# Patient Record
Sex: MALE | Race: BLACK OR AFRICAN AMERICAN | NOT HISPANIC OR LATINO | Employment: OTHER | RURAL
[De-identification: names, ages, dates, MRNs, and addresses within clinical notes are randomized per-mention and may not be internally consistent; named-entity substitution may affect disease eponyms.]

---

## 2020-03-30 LAB — PSA SERPL-MCNC: 2.61 NG/ML

## 2021-01-13 LAB
CHOLEST SERPL-MSCNC: 217 MG/DL (ref 0–200)
HDLC SERPL-MCNC: 53 MG/DL (ref 35–70)
LDLC SERPL CALC-MCNC: 141 MG/DL (ref 0–160)
TRIGL SERPL-MCNC: 116 MG/DL (ref 40–160)

## 2021-03-25 ENCOUNTER — OFFICE VISIT (OUTPATIENT)
Dept: CARDIOLOGY | Facility: CLINIC | Age: 76
End: 2021-03-25
Payer: COMMERCIAL

## 2021-03-25 VITALS
HEART RATE: 72 BPM | OXYGEN SATURATION: 99 % | HEIGHT: 72 IN | WEIGHT: 164 LBS | SYSTOLIC BLOOD PRESSURE: 134 MMHG | DIASTOLIC BLOOD PRESSURE: 68 MMHG | BODY MASS INDEX: 22.21 KG/M2

## 2021-03-25 VITALS
DIASTOLIC BLOOD PRESSURE: 58 MMHG | BODY MASS INDEX: 21.72 KG/M2 | WEIGHT: 160.38 LBS | HEART RATE: 66 BPM | SYSTOLIC BLOOD PRESSURE: 154 MMHG | RESPIRATION RATE: 18 BRPM | HEIGHT: 72 IN

## 2021-03-25 DIAGNOSIS — E78.00 PURE HYPERCHOLESTEROLEMIA: ICD-10-CM

## 2021-03-25 DIAGNOSIS — I25.10 CORONARY ARTERY DISEASE, ANGINA PRESENCE UNSPECIFIED, UNSPECIFIED VESSEL OR LESION TYPE, UNSPECIFIED WHETHER NATIVE OR TRANSPLANTED HEART: Primary | ICD-10-CM

## 2021-03-25 DIAGNOSIS — I21.9 MYOCARDIAL INFARCTION, UNSPECIFIED MI TYPE, UNSPECIFIED ARTERY: ICD-10-CM

## 2021-03-25 DIAGNOSIS — I10 ESSENTIAL HYPERTENSION: ICD-10-CM

## 2021-03-25 DIAGNOSIS — I25.10 CORONARY ARTERY DISEASE INVOLVING NATIVE CORONARY ARTERY OF NATIVE HEART WITHOUT ANGINA PECTORIS: ICD-10-CM

## 2021-03-25 DIAGNOSIS — I73.9 PVD (PERIPHERAL VASCULAR DISEASE): ICD-10-CM

## 2021-03-25 PROCEDURE — 99212 OFFICE O/P EST SF 10 MIN: CPT | Mod: S$PBB,,, | Performed by: NURSE PRACTITIONER

## 2021-03-25 PROCEDURE — 3075F PR MOST RECENT SYSTOLIC BLOOD PRESS GE 130-139MM HG: ICD-10-PCS | Mod: CPTII,,, | Performed by: NURSE PRACTITIONER

## 2021-03-25 PROCEDURE — 93010 ELECTROCARDIOGRAM REPORT: CPT | Mod: S$PBB,,, | Performed by: INTERNAL MEDICINE

## 2021-03-25 PROCEDURE — 99212 PR OFFICE/OUTPT VISIT, EST, LEVL II, 10-19 MIN: ICD-10-PCS | Mod: S$PBB,,, | Performed by: NURSE PRACTITIONER

## 2021-03-25 PROCEDURE — 3078F DIAST BP <80 MM HG: CPT | Mod: CPTII,,, | Performed by: NURSE PRACTITIONER

## 2021-03-25 PROCEDURE — 99999 PR PBB SHADOW E&M-EST. PATIENT-LVL IV: ICD-10-PCS | Mod: PBBFAC,,, | Performed by: NURSE PRACTITIONER

## 2021-03-25 PROCEDURE — 99214 OFFICE O/P EST MOD 30 MIN: CPT | Mod: PBBFAC | Performed by: NURSE PRACTITIONER

## 2021-03-25 PROCEDURE — 3075F SYST BP GE 130 - 139MM HG: CPT | Mod: CPTII,,, | Performed by: NURSE PRACTITIONER

## 2021-03-25 PROCEDURE — 1159F MED LIST DOCD IN RCRD: CPT | Mod: ,,, | Performed by: NURSE PRACTITIONER

## 2021-03-25 PROCEDURE — 99999 PR PBB SHADOW E&M-EST. PATIENT-LVL IV: CPT | Mod: PBBFAC,,, | Performed by: NURSE PRACTITIONER

## 2021-03-25 PROCEDURE — 93005 ELECTROCARDIOGRAM TRACING: CPT | Mod: PBBFAC | Performed by: INTERNAL MEDICINE

## 2021-03-25 PROCEDURE — 3078F PR MOST RECENT DIASTOLIC BLOOD PRESSURE < 80 MM HG: ICD-10-PCS | Mod: CPTII,,, | Performed by: NURSE PRACTITIONER

## 2021-03-25 PROCEDURE — 93010 EKG 12-LEAD: ICD-10-PCS | Mod: S$PBB,,, | Performed by: INTERNAL MEDICINE

## 2021-03-25 PROCEDURE — 1159F PR MEDICATION LIST DOCUMENTED IN MEDICAL RECORD: ICD-10-PCS | Mod: ,,, | Performed by: NURSE PRACTITIONER

## 2021-03-25 RX ORDER — PROPRANOLOL HYDROCHLORIDE 40 MG/1
40 TABLET ORAL 2 TIMES DAILY
COMMUNITY
Start: 2021-01-13 | End: 2021-07-06 | Stop reason: SDUPTHER

## 2021-03-25 RX ORDER — CLOPIDOGREL BISULFATE 75 MG/1
75 TABLET ORAL DAILY
COMMUNITY
Start: 2021-01-13 | End: 2021-07-06 | Stop reason: SDUPTHER

## 2021-03-25 RX ORDER — HYDROCHLOROTHIAZIDE 25 MG/1
25 TABLET ORAL DAILY
COMMUNITY
Start: 2021-01-13 | End: 2021-07-06 | Stop reason: SDUPTHER

## 2021-03-25 RX ORDER — LISINOPRIL 20 MG/1
20 TABLET ORAL DAILY
COMMUNITY
Start: 2021-01-13 | End: 2021-07-06 | Stop reason: SDUPTHER

## 2021-03-25 RX ORDER — VERAPAMIL HYDROCHLORIDE 240 MG/1
240 TABLET, FILM COATED, EXTENDED RELEASE ORAL DAILY
COMMUNITY
Start: 2021-01-13 | End: 2021-07-06 | Stop reason: SDUPTHER

## 2021-03-25 RX ORDER — CILOSTAZOL 50 MG/1
50 TABLET ORAL 2 TIMES DAILY
COMMUNITY
Start: 2021-01-18 | End: 2021-07-06 | Stop reason: SDUPTHER

## 2021-03-25 RX ORDER — ROSUVASTATIN CALCIUM 5 MG/1
5 TABLET, COATED ORAL DAILY
COMMUNITY
Start: 2021-01-13 | End: 2021-07-06 | Stop reason: SDUPTHER

## 2021-07-06 ENCOUNTER — OFFICE VISIT (OUTPATIENT)
Dept: FAMILY MEDICINE | Facility: CLINIC | Age: 76
End: 2021-07-06
Payer: COMMERCIAL

## 2021-07-06 VITALS
HEART RATE: 58 BPM | BODY MASS INDEX: 21.81 KG/M2 | WEIGHT: 161 LBS | TEMPERATURE: 97 F | DIASTOLIC BLOOD PRESSURE: 71 MMHG | SYSTOLIC BLOOD PRESSURE: 143 MMHG | HEIGHT: 72 IN

## 2021-07-06 DIAGNOSIS — I25.10 CORONARY ARTERY DISEASE INVOLVING NATIVE CORONARY ARTERY OF NATIVE HEART WITHOUT ANGINA PECTORIS: ICD-10-CM

## 2021-07-06 DIAGNOSIS — E78.5 HYPERLIPIDEMIA, UNSPECIFIED HYPERLIPIDEMIA TYPE: ICD-10-CM

## 2021-07-06 DIAGNOSIS — I10 ESSENTIAL HYPERTENSION: Primary | ICD-10-CM

## 2021-07-06 DIAGNOSIS — I73.9 PVD (PERIPHERAL VASCULAR DISEASE): ICD-10-CM

## 2021-07-06 LAB
ANION GAP SERPL CALCULATED.3IONS-SCNC: 12 MMOL/L (ref 7–16)
BASOPHILS # BLD AUTO: 0.03 K/UL (ref 0–0.2)
BASOPHILS NFR BLD AUTO: 0.7 % (ref 0–1)
BUN SERPL-MCNC: 26 MG/DL (ref 7–18)
BUN/CREAT SERPL: 11 (ref 6–20)
CALCIUM SERPL-MCNC: 9.6 MG/DL (ref 8.5–10.1)
CHLORIDE SERPL-SCNC: 108 MMOL/L (ref 98–107)
CO2 SERPL-SCNC: 22 MMOL/L (ref 21–32)
CREAT SERPL-MCNC: 2.33 MG/DL (ref 0.7–1.3)
DIFFERENTIAL METHOD BLD: ABNORMAL
EOSINOPHIL # BLD AUTO: 0.28 K/UL (ref 0–0.5)
EOSINOPHIL NFR BLD AUTO: 6.3 % (ref 1–4)
ERYTHROCYTE [DISTWIDTH] IN BLOOD BY AUTOMATED COUNT: 15.5 % (ref 11.5–14.5)
GLUCOSE SERPL-MCNC: 121 MG/DL (ref 74–106)
HCT VFR BLD AUTO: 37.2 % (ref 40–54)
HGB BLD-MCNC: 11.9 G/DL (ref 13.5–18)
IMM GRANULOCYTES # BLD AUTO: 0.01 K/UL (ref 0–0.04)
IMM GRANULOCYTES NFR BLD: 0.2 % (ref 0–0.4)
LYMPHOCYTES # BLD AUTO: 0.84 K/UL (ref 1–4.8)
LYMPHOCYTES NFR BLD AUTO: 18.8 % (ref 27–41)
MCH RBC QN AUTO: 24.5 PG (ref 27–31)
MCHC RBC AUTO-ENTMCNC: 32 G/DL (ref 32–36)
MCV RBC AUTO: 76.7 FL (ref 80–96)
MONOCYTES # BLD AUTO: 0.71 K/UL (ref 0–0.8)
MONOCYTES NFR BLD AUTO: 15.9 % (ref 2–6)
MPC BLD CALC-MCNC: 11.4 FL (ref 9.4–12.4)
NEUTROPHILS # BLD AUTO: 2.59 K/UL (ref 1.8–7.7)
NEUTROPHILS NFR BLD AUTO: 58.1 % (ref 53–65)
NRBC # BLD AUTO: 0 X10E3/UL
NRBC, AUTO (.00): 0 %
PLATELET # BLD AUTO: 226 K/UL (ref 150–400)
POTASSIUM SERPL-SCNC: 4.1 MMOL/L (ref 3.5–5.1)
RBC # BLD AUTO: 4.85 M/UL (ref 4.6–6.2)
SODIUM SERPL-SCNC: 138 MMOL/L (ref 136–145)
TSH SERPL DL<=0.005 MIU/L-ACNC: 1.18 UIU/ML (ref 0.36–3.74)
WBC # BLD AUTO: 4.46 K/UL (ref 4.5–11)

## 2021-07-06 PROCEDURE — 84443 TSH: ICD-10-PCS | Mod: ,,, | Performed by: CLINICAL MEDICAL LABORATORY

## 2021-07-06 PROCEDURE — 1126F AMNT PAIN NOTED NONE PRSNT: CPT | Mod: ,,, | Performed by: NURSE PRACTITIONER

## 2021-07-06 PROCEDURE — 80048 BASIC METABOLIC PANEL: ICD-10-PCS | Mod: ,,, | Performed by: CLINICAL MEDICAL LABORATORY

## 2021-07-06 PROCEDURE — 1159F PR MEDICATION LIST DOCUMENTED IN MEDICAL RECORD: ICD-10-PCS | Mod: ,,, | Performed by: NURSE PRACTITIONER

## 2021-07-06 PROCEDURE — 85025 COMPLETE CBC W/AUTO DIFF WBC: CPT | Mod: ,,, | Performed by: CLINICAL MEDICAL LABORATORY

## 2021-07-06 PROCEDURE — 3077F SYST BP >= 140 MM HG: CPT | Mod: CPTII,,, | Performed by: NURSE PRACTITIONER

## 2021-07-06 PROCEDURE — 99213 OFFICE O/P EST LOW 20 MIN: CPT | Mod: ,,, | Performed by: NURSE PRACTITIONER

## 2021-07-06 PROCEDURE — 80048 BASIC METABOLIC PNL TOTAL CA: CPT | Mod: ,,, | Performed by: CLINICAL MEDICAL LABORATORY

## 2021-07-06 PROCEDURE — 3077F PR MOST RECENT SYSTOLIC BLOOD PRESSURE >= 140 MM HG: ICD-10-PCS | Mod: CPTII,,, | Performed by: NURSE PRACTITIONER

## 2021-07-06 PROCEDURE — 3078F PR MOST RECENT DIASTOLIC BLOOD PRESSURE < 80 MM HG: ICD-10-PCS | Mod: CPTII,,, | Performed by: NURSE PRACTITIONER

## 2021-07-06 PROCEDURE — 84443 ASSAY THYROID STIM HORMONE: CPT | Mod: ,,, | Performed by: CLINICAL MEDICAL LABORATORY

## 2021-07-06 PROCEDURE — 1126F PR PAIN SEVERITY QUANTIFIED, NO PAIN PRESENT: ICD-10-PCS | Mod: ,,, | Performed by: NURSE PRACTITIONER

## 2021-07-06 PROCEDURE — 3078F DIAST BP <80 MM HG: CPT | Mod: CPTII,,, | Performed by: NURSE PRACTITIONER

## 2021-07-06 PROCEDURE — 99213 PR OFFICE/OUTPT VISIT, EST, LEVL III, 20-29 MIN: ICD-10-PCS | Mod: ,,, | Performed by: NURSE PRACTITIONER

## 2021-07-06 PROCEDURE — 85025 CBC WITH DIFFERENTIAL: ICD-10-PCS | Mod: ,,, | Performed by: CLINICAL MEDICAL LABORATORY

## 2021-07-06 PROCEDURE — 1159F MED LIST DOCD IN RCRD: CPT | Mod: ,,, | Performed by: NURSE PRACTITIONER

## 2021-07-06 RX ORDER — HYDROCHLOROTHIAZIDE 25 MG/1
25 TABLET ORAL DAILY
Qty: 90 TABLET | Refills: 0 | Status: SHIPPED | OUTPATIENT
Start: 2021-07-06 | End: 2021-10-11 | Stop reason: SDUPTHER

## 2021-07-06 RX ORDER — CLOPIDOGREL BISULFATE 75 MG/1
75 TABLET ORAL DAILY
Qty: 90 TABLET | Refills: 0 | Status: SHIPPED | OUTPATIENT
Start: 2021-07-06 | End: 2021-10-11 | Stop reason: SDUPTHER

## 2021-07-06 RX ORDER — PROPRANOLOL HYDROCHLORIDE 40 MG/1
40 TABLET ORAL 2 TIMES DAILY
Qty: 180 TABLET | Refills: 0 | Status: SHIPPED | OUTPATIENT
Start: 2021-07-06 | End: 2021-10-11 | Stop reason: SDUPTHER

## 2021-07-06 RX ORDER — VERAPAMIL HYDROCHLORIDE 240 MG/1
240 TABLET, FILM COATED, EXTENDED RELEASE ORAL DAILY
Qty: 90 TABLET | Refills: 0 | Status: SHIPPED | OUTPATIENT
Start: 2021-07-06 | End: 2021-10-11 | Stop reason: SDUPTHER

## 2021-07-06 RX ORDER — ROSUVASTATIN CALCIUM 5 MG/1
5 TABLET, COATED ORAL DAILY
Qty: 90 TABLET | Refills: 0 | Status: SHIPPED | OUTPATIENT
Start: 2021-07-06 | End: 2021-10-11 | Stop reason: SDUPTHER

## 2021-07-06 RX ORDER — LISINOPRIL 20 MG/1
20 TABLET ORAL DAILY
Qty: 90 TABLET | Refills: 0 | Status: SHIPPED | OUTPATIENT
Start: 2021-07-06 | End: 2021-10-11 | Stop reason: SDUPTHER

## 2021-07-06 RX ORDER — CILOSTAZOL 50 MG/1
50 TABLET ORAL 2 TIMES DAILY
Qty: 180 TABLET | Refills: 0 | Status: SHIPPED | OUTPATIENT
Start: 2021-07-06 | End: 2021-10-11 | Stop reason: SDUPTHER

## 2021-07-21 ENCOUNTER — OFFICE VISIT (OUTPATIENT)
Dept: FAMILY MEDICINE | Facility: CLINIC | Age: 76
End: 2021-07-21
Payer: COMMERCIAL

## 2021-07-21 VITALS
OXYGEN SATURATION: 99 % | HEIGHT: 72 IN | WEIGHT: 157 LBS | BODY MASS INDEX: 21.26 KG/M2 | HEART RATE: 76 BPM | SYSTOLIC BLOOD PRESSURE: 181 MMHG | DIASTOLIC BLOOD PRESSURE: 84 MMHG | RESPIRATION RATE: 16 BRPM

## 2021-07-21 DIAGNOSIS — E78.5 HYPERLIPIDEMIA, UNSPECIFIED HYPERLIPIDEMIA TYPE: ICD-10-CM

## 2021-07-21 DIAGNOSIS — I10 ESSENTIAL HYPERTENSION: Primary | ICD-10-CM

## 2021-07-21 DIAGNOSIS — I73.9 PVD (PERIPHERAL VASCULAR DISEASE): ICD-10-CM

## 2021-07-21 DIAGNOSIS — I25.10 CORONARY ARTERY DISEASE INVOLVING NATIVE CORONARY ARTERY OF NATIVE HEART WITHOUT ANGINA PECTORIS: ICD-10-CM

## 2021-07-21 PROCEDURE — 1101F PT FALLS ASSESS-DOCD LE1/YR: CPT | Mod: CPTII,,, | Performed by: NURSE PRACTITIONER

## 2021-07-21 PROCEDURE — 3077F PR MOST RECENT SYSTOLIC BLOOD PRESSURE >= 140 MM HG: ICD-10-PCS | Mod: CPTII,,, | Performed by: NURSE PRACTITIONER

## 2021-07-21 PROCEDURE — G0439 PR MEDICARE ANNUAL WELLNESS SUBSEQUENT VISIT: ICD-10-PCS | Mod: ,,, | Performed by: NURSE PRACTITIONER

## 2021-07-21 PROCEDURE — 1101F PR PT FALLS ASSESS DOC 0-1 FALLS W/OUT INJ PAST YR: ICD-10-PCS | Mod: CPTII,,, | Performed by: NURSE PRACTITIONER

## 2021-07-21 PROCEDURE — 3288F FALL RISK ASSESSMENT DOCD: CPT | Mod: CPTII,,, | Performed by: NURSE PRACTITIONER

## 2021-07-21 PROCEDURE — 3077F SYST BP >= 140 MM HG: CPT | Mod: CPTII,,, | Performed by: NURSE PRACTITIONER

## 2021-07-21 PROCEDURE — 3079F DIAST BP 80-89 MM HG: CPT | Mod: CPTII,,, | Performed by: NURSE PRACTITIONER

## 2021-07-21 PROCEDURE — 1126F AMNT PAIN NOTED NONE PRSNT: CPT | Mod: CPTII,,, | Performed by: NURSE PRACTITIONER

## 2021-07-21 PROCEDURE — 3079F PR MOST RECENT DIASTOLIC BLOOD PRESSURE 80-89 MM HG: ICD-10-PCS | Mod: CPTII,,, | Performed by: NURSE PRACTITIONER

## 2021-07-21 PROCEDURE — 3288F PR FALLS RISK ASSESSMENT DOCUMENTED: ICD-10-PCS | Mod: CPTII,,, | Performed by: NURSE PRACTITIONER

## 2021-07-21 PROCEDURE — G0439 PPPS, SUBSEQ VISIT: HCPCS | Mod: ,,, | Performed by: NURSE PRACTITIONER

## 2021-07-21 PROCEDURE — 1126F PR PAIN SEVERITY QUANTIFIED, NO PAIN PRESENT: ICD-10-PCS | Mod: CPTII,,, | Performed by: NURSE PRACTITIONER

## 2021-10-11 ENCOUNTER — OFFICE VISIT (OUTPATIENT)
Dept: FAMILY MEDICINE | Facility: CLINIC | Age: 76
End: 2021-10-11
Payer: COMMERCIAL

## 2021-10-11 VITALS
HEIGHT: 72 IN | TEMPERATURE: 97 F | DIASTOLIC BLOOD PRESSURE: 82 MMHG | SYSTOLIC BLOOD PRESSURE: 144 MMHG | BODY MASS INDEX: 21.26 KG/M2 | HEART RATE: 58 BPM | OXYGEN SATURATION: 100 % | WEIGHT: 157 LBS

## 2021-10-11 DIAGNOSIS — I73.9 PVD (PERIPHERAL VASCULAR DISEASE): ICD-10-CM

## 2021-10-11 DIAGNOSIS — I21.9 MYOCARDIAL INFARCTION, UNSPECIFIED MI TYPE, UNSPECIFIED ARTERY: ICD-10-CM

## 2021-10-11 DIAGNOSIS — I25.10 CORONARY ARTERY DISEASE INVOLVING NATIVE CORONARY ARTERY OF NATIVE HEART WITHOUT ANGINA PECTORIS: ICD-10-CM

## 2021-10-11 DIAGNOSIS — E78.5 HYPERLIPIDEMIA, UNSPECIFIED HYPERLIPIDEMIA TYPE: ICD-10-CM

## 2021-10-11 DIAGNOSIS — I10 ESSENTIAL HYPERTENSION: Primary | ICD-10-CM

## 2021-10-11 PROCEDURE — 99213 OFFICE O/P EST LOW 20 MIN: CPT | Mod: ,,, | Performed by: NURSE PRACTITIONER

## 2021-10-11 PROCEDURE — 1101F PT FALLS ASSESS-DOCD LE1/YR: CPT | Mod: CPTII,,, | Performed by: NURSE PRACTITIONER

## 2021-10-11 PROCEDURE — 1160F RVW MEDS BY RX/DR IN RCRD: CPT | Mod: CPTII,,, | Performed by: NURSE PRACTITIONER

## 2021-10-11 PROCEDURE — 3288F FALL RISK ASSESSMENT DOCD: CPT | Mod: CPTII,,, | Performed by: NURSE PRACTITIONER

## 2021-10-11 PROCEDURE — 1160F PR REVIEW ALL MEDS BY PRESCRIBER/CLIN PHARMACIST DOCUMENTED: ICD-10-PCS | Mod: CPTII,,, | Performed by: NURSE PRACTITIONER

## 2021-10-11 PROCEDURE — 1101F PR PT FALLS ASSESS DOC 0-1 FALLS W/OUT INJ PAST YR: ICD-10-PCS | Mod: CPTII,,, | Performed by: NURSE PRACTITIONER

## 2021-10-11 PROCEDURE — 1159F MED LIST DOCD IN RCRD: CPT | Mod: CPTII,,, | Performed by: NURSE PRACTITIONER

## 2021-10-11 PROCEDURE — 3077F SYST BP >= 140 MM HG: CPT | Mod: CPTII,,, | Performed by: NURSE PRACTITIONER

## 2021-10-11 PROCEDURE — 3288F PR FALLS RISK ASSESSMENT DOCUMENTED: ICD-10-PCS | Mod: CPTII,,, | Performed by: NURSE PRACTITIONER

## 2021-10-11 PROCEDURE — 3077F PR MOST RECENT SYSTOLIC BLOOD PRESSURE >= 140 MM HG: ICD-10-PCS | Mod: CPTII,,, | Performed by: NURSE PRACTITIONER

## 2021-10-11 PROCEDURE — 3079F DIAST BP 80-89 MM HG: CPT | Mod: CPTII,,, | Performed by: NURSE PRACTITIONER

## 2021-10-11 PROCEDURE — 99213 PR OFFICE/OUTPT VISIT, EST, LEVL III, 20-29 MIN: ICD-10-PCS | Mod: ,,, | Performed by: NURSE PRACTITIONER

## 2021-10-11 PROCEDURE — 1159F PR MEDICATION LIST DOCUMENTED IN MEDICAL RECORD: ICD-10-PCS | Mod: CPTII,,, | Performed by: NURSE PRACTITIONER

## 2021-10-11 PROCEDURE — 3079F PR MOST RECENT DIASTOLIC BLOOD PRESSURE 80-89 MM HG: ICD-10-PCS | Mod: CPTII,,, | Performed by: NURSE PRACTITIONER

## 2021-10-11 RX ORDER — CLOPIDOGREL BISULFATE 75 MG/1
75 TABLET ORAL DAILY
Qty: 90 TABLET | Refills: 0 | Status: SHIPPED | OUTPATIENT
Start: 2021-10-11 | End: 2022-01-01 | Stop reason: SDUPTHER

## 2021-10-11 RX ORDER — LISINOPRIL 20 MG/1
20 TABLET ORAL DAILY
Qty: 90 TABLET | Refills: 0 | Status: SHIPPED | OUTPATIENT
Start: 2021-10-11 | End: 2022-01-01 | Stop reason: SDUPTHER

## 2021-10-11 RX ORDER — VERAPAMIL HYDROCHLORIDE 240 MG/1
240 TABLET, FILM COATED, EXTENDED RELEASE ORAL DAILY
Qty: 90 TABLET | Refills: 0 | Status: SHIPPED | OUTPATIENT
Start: 2021-10-11 | End: 2022-01-01 | Stop reason: SDUPTHER

## 2021-10-11 RX ORDER — ROSUVASTATIN CALCIUM 5 MG/1
5 TABLET, COATED ORAL DAILY
Qty: 90 TABLET | Refills: 0 | Status: SHIPPED | OUTPATIENT
Start: 2021-10-11 | End: 2022-01-01 | Stop reason: SDUPTHER

## 2021-10-11 RX ORDER — HYDROCHLOROTHIAZIDE 25 MG/1
25 TABLET ORAL DAILY
Qty: 90 TABLET | Refills: 0 | Status: SHIPPED | OUTPATIENT
Start: 2021-10-11 | End: 2022-01-01 | Stop reason: SDUPTHER

## 2021-10-11 RX ORDER — CILOSTAZOL 50 MG/1
50 TABLET ORAL 2 TIMES DAILY
Qty: 180 TABLET | Refills: 0 | Status: SHIPPED | OUTPATIENT
Start: 2021-10-11 | End: 2022-01-01 | Stop reason: SDUPTHER

## 2021-10-11 RX ORDER — PROPRANOLOL HYDROCHLORIDE 40 MG/1
40 TABLET ORAL 2 TIMES DAILY
Qty: 180 TABLET | Refills: 0 | Status: SHIPPED | OUTPATIENT
Start: 2021-10-11 | End: 2022-01-01 | Stop reason: SDUPTHER

## 2021-12-09 ENCOUNTER — IMMUNIZATION (OUTPATIENT)
Dept: FAMILY MEDICINE | Facility: CLINIC | Age: 76
End: 2021-12-09
Payer: MEDICARE

## 2021-12-09 DIAGNOSIS — Z23 NEED FOR VACCINATION: Primary | ICD-10-CM

## 2021-12-09 PROCEDURE — 0064A COVID-19, MRNA, LNP-S, PF, 100 MCG/0.25 ML DOSE VACCINE (MODERNA BOOSTER): CPT | Mod: ,,, | Performed by: FAMILY MEDICINE

## 2021-12-09 PROCEDURE — 91306 COVID-19, MRNA, LNP-S, PF, 100 MCG/0.25 ML DOSE VACCINE (MODERNA BOOSTER): CPT | Mod: ,,, | Performed by: FAMILY MEDICINE

## 2021-12-09 PROCEDURE — 0064A COVID-19, MRNA, LNP-S, PF, 100 MCG/0.25 ML DOSE VACCINE (MODERNA BOOSTER): ICD-10-PCS | Mod: ,,, | Performed by: FAMILY MEDICINE

## 2021-12-09 PROCEDURE — 91306 COVID-19, MRNA, LNP-S, PF, 100 MCG/0.25 ML DOSE VACCINE (MODERNA BOOSTER): ICD-10-PCS | Mod: ,,, | Performed by: FAMILY MEDICINE

## 2022-01-01 ENCOUNTER — OFFICE VISIT (OUTPATIENT)
Dept: CARDIOLOGY | Facility: CLINIC | Age: 77
End: 2022-01-01
Payer: MEDICARE

## 2022-01-01 ENCOUNTER — OFFICE VISIT (OUTPATIENT)
Dept: FAMILY MEDICINE | Facility: CLINIC | Age: 77
End: 2022-01-01
Payer: MEDICARE

## 2022-01-01 ENCOUNTER — HOSPITAL ENCOUNTER (INPATIENT)
Facility: HOSPITAL | Age: 77
LOS: 2 days | DRG: 811 | End: 2023-01-02
Attending: STUDENT IN AN ORGANIZED HEALTH CARE EDUCATION/TRAINING PROGRAM | Admitting: STUDENT IN AN ORGANIZED HEALTH CARE EDUCATION/TRAINING PROGRAM
Payer: MEDICARE

## 2022-01-01 VITALS
TEMPERATURE: 97 F | WEIGHT: 161 LBS | HEIGHT: 72 IN | HEART RATE: 58 BPM | DIASTOLIC BLOOD PRESSURE: 65 MMHG | SYSTOLIC BLOOD PRESSURE: 125 MMHG | OXYGEN SATURATION: 100 % | BODY MASS INDEX: 21.81 KG/M2

## 2022-01-01 VITALS
HEIGHT: 72 IN | WEIGHT: 155 LBS | BODY MASS INDEX: 20.99 KG/M2 | DIASTOLIC BLOOD PRESSURE: 59 MMHG | OXYGEN SATURATION: 98 % | HEART RATE: 65 BPM | SYSTOLIC BLOOD PRESSURE: 138 MMHG | TEMPERATURE: 97 F

## 2022-01-01 VITALS
HEART RATE: 71 BPM | BODY MASS INDEX: 20.05 KG/M2 | HEIGHT: 72 IN | DIASTOLIC BLOOD PRESSURE: 96 MMHG | SYSTOLIC BLOOD PRESSURE: 205 MMHG | WEIGHT: 148 LBS | OXYGEN SATURATION: 97 %

## 2022-01-01 VITALS
WEIGHT: 151 LBS | SYSTOLIC BLOOD PRESSURE: 138 MMHG | HEIGHT: 72 IN | HEART RATE: 52 BPM | TEMPERATURE: 98 F | RESPIRATION RATE: 12 BRPM | BODY MASS INDEX: 20.45 KG/M2 | DIASTOLIC BLOOD PRESSURE: 74 MMHG | OXYGEN SATURATION: 99 %

## 2022-01-01 VITALS
OXYGEN SATURATION: 99 % | HEART RATE: 57 BPM | DIASTOLIC BLOOD PRESSURE: 60 MMHG | SYSTOLIC BLOOD PRESSURE: 136 MMHG | BODY MASS INDEX: 21.54 KG/M2 | WEIGHT: 159 LBS | HEIGHT: 72 IN

## 2022-01-01 DIAGNOSIS — D64.9 ANEMIA, UNSPECIFIED TYPE: ICD-10-CM

## 2022-01-01 DIAGNOSIS — D62 ACUTE BLOOD LOSS ANEMIA: ICD-10-CM

## 2022-01-01 DIAGNOSIS — I10 PRIMARY HYPERTENSION: Primary | ICD-10-CM

## 2022-01-01 DIAGNOSIS — I73.9 PVD (PERIPHERAL VASCULAR DISEASE): ICD-10-CM

## 2022-01-01 DIAGNOSIS — I10 ESSENTIAL HYPERTENSION: Chronic | ICD-10-CM

## 2022-01-01 DIAGNOSIS — I25.10 CORONARY ARTERY DISEASE INVOLVING NATIVE CORONARY ARTERY OF NATIVE HEART WITHOUT ANGINA PECTORIS: Chronic | ICD-10-CM

## 2022-01-01 DIAGNOSIS — Z13.1 SCREENING FOR DIABETES MELLITUS: ICD-10-CM

## 2022-01-01 DIAGNOSIS — Z00.00 ENCOUNTER FOR SUBSEQUENT ANNUAL WELLNESS VISIT (AWV) IN MEDICARE PATIENT: Primary | ICD-10-CM

## 2022-01-01 DIAGNOSIS — E78.00 PURE HYPERCHOLESTEROLEMIA: ICD-10-CM

## 2022-01-01 DIAGNOSIS — Z12.5 SCREENING FOR PROSTATE CANCER: ICD-10-CM

## 2022-01-01 DIAGNOSIS — R06.02 SOB (SHORTNESS OF BREATH): ICD-10-CM

## 2022-01-01 DIAGNOSIS — I25.10 CORONARY ARTERY DISEASE INVOLVING NATIVE CORONARY ARTERY OF NATIVE HEART WITHOUT ANGINA PECTORIS: ICD-10-CM

## 2022-01-01 DIAGNOSIS — R53.1 GENERALIZED WEAKNESS: ICD-10-CM

## 2022-01-01 DIAGNOSIS — I25.10 CORONARY ARTERY DISEASE INVOLVING NATIVE CORONARY ARTERY OF NATIVE HEART WITHOUT ANGINA PECTORIS: Primary | ICD-10-CM

## 2022-01-01 DIAGNOSIS — I10 ESSENTIAL HYPERTENSION: Primary | ICD-10-CM

## 2022-01-01 DIAGNOSIS — E78.00 PURE HYPERCHOLESTEROLEMIA: Chronic | ICD-10-CM

## 2022-01-01 DIAGNOSIS — Z71.89 COMPLEX CARE COORDINATION: ICD-10-CM

## 2022-01-01 DIAGNOSIS — I25.10 CORONARY ARTERY DISEASE, UNSPECIFIED VESSEL OR LESION TYPE, UNSPECIFIED WHETHER ANGINA PRESENT, UNSPECIFIED WHETHER NATIVE OR TRANSPLANTED HEART: Primary | ICD-10-CM

## 2022-01-01 DIAGNOSIS — I73.9 PVD (PERIPHERAL VASCULAR DISEASE): Chronic | ICD-10-CM

## 2022-01-01 DIAGNOSIS — I46.9 CARDIOPULMONARY ARREST: ICD-10-CM

## 2022-01-01 DIAGNOSIS — I10 PRIMARY HYPERTENSION: ICD-10-CM

## 2022-01-01 DIAGNOSIS — N18.32 STAGE 3B CHRONIC KIDNEY DISEASE: ICD-10-CM

## 2022-01-01 DIAGNOSIS — N18.30 STAGE 3 CHRONIC KIDNEY DISEASE, UNSPECIFIED WHETHER STAGE 3A OR 3B CKD: Chronic | ICD-10-CM

## 2022-01-01 DIAGNOSIS — N17.9 ACUTE KIDNEY INJURY: Primary | ICD-10-CM

## 2022-01-01 LAB
ABO + RH BLD: NORMAL
ABO + RH BLD: NORMAL
ALBUMIN SERPL BCP-MCNC: 3.3 G/DL (ref 3.5–5)
ALBUMIN SERPL BCP-MCNC: 3.6 G/DL (ref 3.5–5)
ALBUMIN SERPL BCP-MCNC: 3.6 G/DL (ref 3.5–5)
ALBUMIN/GLOB SERPL: 0.7 {RATIO}
ALBUMIN/GLOB SERPL: 0.9 {RATIO}
ALBUMIN/GLOB SERPL: 0.9 {RATIO}
ALP SERPL-CCNC: 60 U/L (ref 45–115)
ALP SERPL-CCNC: 67 U/L (ref 45–115)
ALP SERPL-CCNC: 76 U/L (ref 45–115)
ALT SERPL W P-5'-P-CCNC: 15 U/L (ref 16–61)
ALT SERPL W P-5'-P-CCNC: 17 U/L (ref 16–61)
ALT SERPL W P-5'-P-CCNC: 25 U/L (ref 16–61)
ANION GAP SERPL CALCULATED.3IONS-SCNC: 11 MMOL/L (ref 7–16)
ANION GAP SERPL CALCULATED.3IONS-SCNC: 19 MMOL/L (ref 7–16)
ANION GAP SERPL CALCULATED.3IONS-SCNC: 9 MMOL/L (ref 7–16)
ANISOCYTOSIS BLD QL SMEAR: ABNORMAL
AST SERPL W P-5'-P-CCNC: 16 U/L (ref 15–37)
AST SERPL W P-5'-P-CCNC: 20 U/L (ref 15–37)
AST SERPL W P-5'-P-CCNC: 21 U/L (ref 15–37)
BASOPHILS # BLD AUTO: 0 K/UL (ref 0–0.2)
BASOPHILS # BLD AUTO: 0.03 K/UL (ref 0–0.2)
BASOPHILS # BLD AUTO: 0.04 K/UL (ref 0–0.2)
BASOPHILS NFR BLD AUTO: 0 % (ref 0–1)
BASOPHILS NFR BLD AUTO: 0.8 % (ref 0–1)
BASOPHILS NFR BLD AUTO: 0.8 % (ref 0–1)
BILIRUB SERPL-MCNC: 0.3 MG/DL (ref 0–1.2)
BILIRUB SERPL-MCNC: 0.3 MG/DL (ref ?–1.2)
BILIRUB SERPL-MCNC: 0.4 MG/DL (ref ?–1.2)
BILIRUB UR QL STRIP: NEGATIVE
BLD PROD TYP BPU: NORMAL
BLD PROD TYP BPU: NORMAL
BLOOD UNIT EXPIRATION DATE: NORMAL
BLOOD UNIT EXPIRATION DATE: NORMAL
BLOOD UNIT TYPE CODE: 7300
BLOOD UNIT TYPE CODE: 7300
BUN SERPL-MCNC: 23 MG/DL (ref 7–18)
BUN SERPL-MCNC: 27 MG/DL (ref 7–18)
BUN SERPL-MCNC: 86 MG/DL (ref 7–18)
BUN/CREAT SERPL: 10 (ref 6–20)
BUN/CREAT SERPL: 11 (ref 6–20)
BUN/CREAT SERPL: 17 (ref 6–20)
CALCIUM SERPL-MCNC: 8.8 MG/DL (ref 8.5–10.1)
CALCIUM SERPL-MCNC: 8.9 MG/DL (ref 8.5–10.1)
CALCIUM SERPL-MCNC: 9.2 MG/DL (ref 8.5–10.1)
CHLORIDE SERPL-SCNC: 105 MMOL/L (ref 98–107)
CHLORIDE SERPL-SCNC: 107 MMOL/L (ref 98–107)
CHLORIDE SERPL-SCNC: 108 MMOL/L (ref 98–107)
CHOLEST SERPL-MCNC: 180 MG/DL (ref 0–200)
CHOLEST/HDLC SERPL: 3.7 {RATIO}
CLARITY UR: CLEAR
CO2 SERPL-SCNC: 17 MMOL/L (ref 21–32)
CO2 SERPL-SCNC: 24 MMOL/L (ref 21–32)
CO2 SERPL-SCNC: 28 MMOL/L (ref 21–32)
COLOR UR: ABNORMAL
CREAT SERPL-MCNC: 2.18 MG/DL (ref 0.7–1.3)
CREAT SERPL-MCNC: 2.68 MG/DL (ref 0.7–1.3)
CREAT SERPL-MCNC: 5.2 MG/DL (ref 0.7–1.3)
CROSSMATCH INTERPRETATION: NORMAL
CROSSMATCH INTERPRETATION: NORMAL
DIFFERENTIAL METHOD BLD: ABNORMAL
DISPENSE STATUS: NORMAL
DISPENSE STATUS: NORMAL
EGFR (NO RACE VARIABLE) (RUSH/TITUS): 11 ML/MIN/1.73M²
EGFR (NO RACE VARIABLE) (RUSH/TITUS): 30 ML/MIN/1.73M²
EOSINOPHIL # BLD AUTO: 0 K/UL (ref 0–0.5)
EOSINOPHIL # BLD AUTO: 0.17 K/UL (ref 0–0.5)
EOSINOPHIL # BLD AUTO: 0.3 K/UL (ref 0–0.5)
EOSINOPHIL NFR BLD AUTO: 0 % (ref 1–4)
EOSINOPHIL NFR BLD AUTO: 4.6 % (ref 1–4)
EOSINOPHIL NFR BLD AUTO: 6.3 % (ref 1–4)
EOSINOPHIL NFR BLD MANUAL: 1 % (ref 1–4)
ERYTHROCYTE [DISTWIDTH] IN BLOOD BY AUTOMATED COUNT: 15.1 % (ref 11.5–14.5)
ERYTHROCYTE [DISTWIDTH] IN BLOOD BY AUTOMATED COUNT: 15.4 % (ref 11.5–14.5)
ERYTHROCYTE [DISTWIDTH] IN BLOOD BY AUTOMATED COUNT: 16.1 % (ref 11.5–14.5)
EST. AVERAGE GLUCOSE BLD GHB EST-MCNC: 100 MG/DL
GLOBULIN SER-MCNC: 4 G/DL (ref 2–4)
GLOBULIN SER-MCNC: 4.2 G/DL (ref 2–4)
GLOBULIN SER-MCNC: 4.8 G/DL (ref 2–4)
GLUCOSE SERPL-MCNC: 137 MG/DL (ref 74–106)
GLUCOSE SERPL-MCNC: 83 MG/DL (ref 70–105)
GLUCOSE SERPL-MCNC: 86 MG/DL (ref 74–106)
GLUCOSE SERPL-MCNC: 92 MG/DL (ref 70–105)
GLUCOSE SERPL-MCNC: 98 MG/DL (ref 74–106)
GLUCOSE UR STRIP-MCNC: NORMAL MG/DL
HBA1C MFR BLD HPLC: 5.6 % (ref 4.5–6.6)
HCO3 UR-SCNC: 16.6 MMOL/L (ref 24–28)
HCT VFR BLD AUTO: 25.4 % (ref 40–54)
HCT VFR BLD AUTO: 37.2 % (ref 40–54)
HCT VFR BLD AUTO: 37.9 % (ref 40–54)
HCT VFR BLD CALC: 23 % (ref 35–51)
HDLC SERPL-MCNC: 49 MG/DL (ref 40–60)
HGB BLD-MCNC: 11.8 G/DL (ref 13.5–18)
HGB BLD-MCNC: 11.9 G/DL (ref 13.5–18)
HGB BLD-MCNC: 7.8 G/DL (ref 13.5–18)
IMM GRANULOCYTES # BLD AUTO: 0.01 K/UL (ref 0–0.04)
IMM GRANULOCYTES # BLD AUTO: 0.01 K/UL (ref 0–0.04)
IMM GRANULOCYTES # BLD AUTO: 0.02 K/UL (ref 0–0.04)
IMM GRANULOCYTES NFR BLD: 0.2 % (ref 0–0.4)
IMM GRANULOCYTES NFR BLD: 0.3 % (ref 0–0.4)
IMM GRANULOCYTES NFR BLD: 0.4 % (ref 0–0.4)
INDIRECT COOMBS: NORMAL
KETONES UR STRIP-SCNC: NEGATIVE MG/DL
LACTATE SERPL-SCNC: 1.3 MMOL/L (ref 0.4–2)
LACTATE SERPL-SCNC: 3.5 MMOL/L (ref 0.4–2)
LDH SERPL L TO P-CCNC: 2.8 MMOL/L (ref 0.3–1.2)
LDLC SERPL CALC-MCNC: 92 MG/DL
LDLC/HDLC SERPL: 1.9 {RATIO}
LEUKOCYTE ESTERASE UR QL STRIP: NEGATIVE
LYMPHOCYTES # BLD AUTO: 0.2 K/UL (ref 1–4.8)
LYMPHOCYTES # BLD AUTO: 0.6 K/UL (ref 1–4.8)
LYMPHOCYTES # BLD AUTO: 0.91 K/UL (ref 1–4.8)
LYMPHOCYTES NFR BLD AUTO: 16.4 % (ref 27–41)
LYMPHOCYTES NFR BLD AUTO: 19.1 % (ref 27–41)
LYMPHOCYTES NFR BLD AUTO: 3.5 % (ref 27–41)
LYMPHOCYTES NFR BLD MANUAL: 2 % (ref 27–41)
MCH RBC QN AUTO: 22.2 PG (ref 27–31)
MCH RBC QN AUTO: 24.5 PG (ref 27–31)
MCH RBC QN AUTO: 25.1 PG (ref 27–31)
MCHC RBC AUTO-ENTMCNC: 30.7 G/DL (ref 32–36)
MCHC RBC AUTO-ENTMCNC: 31.4 G/DL (ref 32–36)
MCHC RBC AUTO-ENTMCNC: 31.7 G/DL (ref 32–36)
MCV RBC AUTO: 72.2 FL (ref 80–96)
MCV RBC AUTO: 78 FL (ref 80–96)
MCV RBC AUTO: 79 FL (ref 80–96)
MONOCYTES # BLD AUTO: 0.49 K/UL (ref 0–0.8)
MONOCYTES # BLD AUTO: 0.52 K/UL (ref 0–0.8)
MONOCYTES # BLD AUTO: 1.01 K/UL (ref 0–0.8)
MONOCYTES NFR BLD AUTO: 14.2 % (ref 2–6)
MONOCYTES NFR BLD AUTO: 21.2 % (ref 2–6)
MONOCYTES NFR BLD AUTO: 8.6 % (ref 2–6)
MONOCYTES NFR BLD MANUAL: 5 % (ref 2–6)
MPC BLD CALC-MCNC: 10.6 FL (ref 9.4–12.4)
MPC BLD CALC-MCNC: 11.5 FL (ref 9.4–12.4)
MPC BLD CALC-MCNC: 11.8 FL (ref 9.4–12.4)
NEUTROPHILS # BLD AUTO: 2.33 K/UL (ref 1.8–7.7)
NEUTROPHILS # BLD AUTO: 2.49 K/UL (ref 1.8–7.7)
NEUTROPHILS # BLD AUTO: 4.96 K/UL (ref 1.8–7.7)
NEUTROPHILS NFR BLD AUTO: 52.4 % (ref 53–65)
NEUTROPHILS NFR BLD AUTO: 63.7 % (ref 53–65)
NEUTROPHILS NFR BLD AUTO: 87.5 % (ref 53–65)
NEUTS BAND NFR BLD MANUAL: 3 % (ref 1–5)
NEUTS SEG NFR BLD MANUAL: 89 % (ref 50–62)
NITRITE UR QL STRIP: NEGATIVE
NONHDLC SERPL-MCNC: 131 MG/DL
NRBC # BLD AUTO: 0 X10E3/UL
NRBC # BLD AUTO: 0 X10E3/UL
NRBC # BLD AUTO: 0.06 X10E3/UL
NRBC, AUTO (.00): 0 %
NRBC, AUTO (.00): 0 %
NRBC, AUTO (.00): 1.1 %
OVALOCYTES BLD QL SMEAR: ABNORMAL
PCO2 BLDA: 28 MMHG (ref 41–51)
PH SMN: 7.38 [PH] (ref 7.32–7.42)
PH UR STRIP: 7 PH UNITS
PLATELET # BLD AUTO: 236 K/UL (ref 150–400)
PLATELET # BLD AUTO: 260 K/UL (ref 150–400)
PLATELET # BLD AUTO: 354 K/UL (ref 150–400)
PLATELET MORPHOLOGY: ABNORMAL
PO2 BLDA: 98 MMHG (ref 25–40)
POC BASE EXCESS: -7.7 MMOL/L (ref -2–3)
POC CO2: 17.5 MMOL/L
POC IONIZED CALCIUM: 1.18 MMOL/L (ref 1.15–1.35)
POC OCCULT BLOOD: NEGATIVE
POC SATURATED O2: 97 % (ref 40–70)
POCT GLUCOSE: 210 MG/DL (ref 60–95)
POLYCHROMASIA BLD QL SMEAR: ABNORMAL
POTASSIUM BLD-SCNC: 4.7 MMOL/L (ref 3.4–4.5)
POTASSIUM SERPL-SCNC: 3.8 MMOL/L (ref 3.5–5.1)
POTASSIUM SERPL-SCNC: 4.4 MMOL/L (ref 3.5–5.1)
POTASSIUM SERPL-SCNC: 4.6 MMOL/L (ref 3.5–5.1)
PROT SERPL-MCNC: 7.6 G/DL (ref 6.4–8.2)
PROT SERPL-MCNC: 7.8 G/DL (ref 6.4–8.2)
PROT SERPL-MCNC: 8.1 G/DL (ref 6.4–8.2)
PROT UR QL STRIP: 200
PSA SERPL-MCNC: 3.6 NG/ML
RBC # BLD AUTO: 3.52 M/UL (ref 4.6–6.2)
RBC # BLD AUTO: 4.71 M/UL (ref 4.6–6.2)
RBC # BLD AUTO: 4.86 M/UL (ref 4.6–6.2)
RBC # UR STRIP: NEGATIVE /UL
RH BLD: NORMAL
SODIUM BLD-SCNC: 135 MMOL/L (ref 136–145)
SODIUM SERPL-SCNC: 136 MMOL/L (ref 136–145)
SODIUM SERPL-SCNC: 139 MMOL/L (ref 136–145)
SODIUM SERPL-SCNC: 140 MMOL/L (ref 136–145)
SP GR UR STRIP: 1.01
TRIGL SERPL-MCNC: 195 MG/DL (ref 35–150)
TROPONIN I SERPL HS-MCNC: 28 PG/ML
UNIT NUMBER: NORMAL
UNIT NUMBER: NORMAL
UROBILINOGEN UR STRIP-ACNC: NORMAL MG/DL
VLDLC SERPL-MCNC: 39 MG/DL
WBC # BLD AUTO: 3.66 K/UL (ref 4.5–11)
WBC # BLD AUTO: 4.76 K/UL (ref 4.5–11)
WBC # BLD AUTO: 5.67 K/UL (ref 4.5–11)

## 2022-01-01 PROCEDURE — 3288F PR FALLS RISK ASSESSMENT DOCUMENTED: ICD-10-PCS | Mod: ,,, | Performed by: NURSE PRACTITIONER

## 2022-01-01 PROCEDURE — 3288F PR FALLS RISK ASSESSMENT DOCUMENTED: ICD-10-PCS | Mod: ,,, | Performed by: FAMILY MEDICINE

## 2022-01-01 PROCEDURE — 85025 COMPLETE CBC W/AUTO DIFF WBC: CPT | Performed by: NURSE PRACTITIONER

## 2022-01-01 PROCEDURE — 3288F FALL RISK ASSESSMENT DOCD: CPT | Mod: ,,, | Performed by: FAMILY MEDICINE

## 2022-01-01 PROCEDURE — 93010 EKG 12-LEAD: ICD-10-PCS | Mod: ,,, | Performed by: STUDENT IN AN ORGANIZED HEALTH CARE EDUCATION/TRAINING PROGRAM

## 2022-01-01 PROCEDURE — 36415 COLL VENOUS BLD VENIPUNCTURE: CPT | Mod: ,,, | Performed by: CLINICAL MEDICAL LABORATORY

## 2022-01-01 PROCEDURE — 1126F AMNT PAIN NOTED NONE PRSNT: CPT | Mod: ,,, | Performed by: NURSE PRACTITIONER

## 2022-01-01 PROCEDURE — 80053 COMPREHENSIVE METABOLIC PANEL: ICD-10-PCS | Mod: ,,, | Performed by: CLINICAL MEDICAL LABORATORY

## 2022-01-01 PROCEDURE — 1101F PT FALLS ASSESS-DOCD LE1/YR: CPT | Mod: ,,, | Performed by: NURSE PRACTITIONER

## 2022-01-01 PROCEDURE — 1101F PT FALLS ASSESS-DOCD LE1/YR: CPT | Mod: ,,, | Performed by: FAMILY MEDICINE

## 2022-01-01 PROCEDURE — 1101F PR PT FALLS ASSESS DOC 0-1 FALLS W/OUT INJ PAST YR: ICD-10-PCS | Mod: ,,, | Performed by: NURSE PRACTITIONER

## 2022-01-01 PROCEDURE — G0439 PPPS, SUBSEQ VISIT: HCPCS | Mod: ,,, | Performed by: FAMILY MEDICINE

## 2022-01-01 PROCEDURE — 3074F SYST BP LT 130 MM HG: CPT | Mod: ,,, | Performed by: NURSE PRACTITIONER

## 2022-01-01 PROCEDURE — 1159F MED LIST DOCD IN RCRD: CPT | Mod: ,,, | Performed by: FAMILY MEDICINE

## 2022-01-01 PROCEDURE — 3078F PR MOST RECENT DIASTOLIC BLOOD PRESSURE < 80 MM HG: ICD-10-PCS | Mod: CPTII,,, | Performed by: NURSE PRACTITIONER

## 2022-01-01 PROCEDURE — 3075F SYST BP GE 130 - 139MM HG: CPT | Mod: ,,, | Performed by: NURSE PRACTITIONER

## 2022-01-01 PROCEDURE — 83605 ASSAY OF LACTIC ACID: CPT

## 2022-01-01 PROCEDURE — 85025 CBC WITH DIFFERENTIAL: ICD-10-PCS | Mod: ,,, | Performed by: CLINICAL MEDICAL LABORATORY

## 2022-01-01 PROCEDURE — 1159F PR MEDICATION LIST DOCUMENTED IN MEDICAL RECORD: ICD-10-PCS | Mod: CPTII,,, | Performed by: NURSE PRACTITIONER

## 2022-01-01 PROCEDURE — 63600175 PHARM REV CODE 636 W HCPCS

## 2022-01-01 PROCEDURE — 1160F RVW MEDS BY RX/DR IN RCRD: CPT | Mod: ,,, | Performed by: NURSE PRACTITIONER

## 2022-01-01 PROCEDURE — 1159F PR MEDICATION LIST DOCUMENTED IN MEDICAL RECORD: ICD-10-PCS | Mod: ,,, | Performed by: NURSE PRACTITIONER

## 2022-01-01 PROCEDURE — 85025 COMPLETE CBC W/AUTO DIFF WBC: CPT | Mod: ,,, | Performed by: CLINICAL MEDICAL LABORATORY

## 2022-01-01 PROCEDURE — 80061 LIPID PANEL: ICD-10-PCS | Mod: ,,, | Performed by: CLINICAL MEDICAL LABORATORY

## 2022-01-01 PROCEDURE — 93005 ELECTROCARDIOGRAM TRACING: CPT | Mod: PBBFAC | Performed by: INTERNAL MEDICINE

## 2022-01-01 PROCEDURE — 83036 HEMOGLOBIN GLYCOSYLATED A1C: CPT | Mod: GZ,,, | Performed by: CLINICAL MEDICAL LABORATORY

## 2022-01-01 PROCEDURE — 3078F DIAST BP <80 MM HG: CPT | Mod: ,,, | Performed by: NURSE PRACTITIONER

## 2022-01-01 PROCEDURE — 82962 GLUCOSE BLOOD TEST: CPT

## 2022-01-01 PROCEDURE — 80053 COMPREHEN METABOLIC PANEL: CPT | Performed by: NURSE PRACTITIONER

## 2022-01-01 PROCEDURE — 99214 OFFICE O/P EST MOD 30 MIN: CPT | Mod: S$PBB,,, | Performed by: NURSE PRACTITIONER

## 2022-01-01 PROCEDURE — 1160F PR REVIEW ALL MEDS BY PRESCRIBER/CLIN PHARMACIST DOCUMENTED: ICD-10-PCS | Mod: CPTII,,, | Performed by: NURSE PRACTITIONER

## 2022-01-01 PROCEDURE — 84295 ASSAY OF SERUM SODIUM: CPT

## 2022-01-01 PROCEDURE — 85014 HEMATOCRIT: CPT

## 2022-01-01 PROCEDURE — 25000003 PHARM REV CODE 250

## 2022-01-01 PROCEDURE — 3075F SYST BP GE 130 - 139MM HG: CPT | Mod: CPTII,,, | Performed by: NURSE PRACTITIONER

## 2022-01-01 PROCEDURE — 84132 ASSAY OF SERUM POTASSIUM: CPT

## 2022-01-01 PROCEDURE — 82330 ASSAY OF CALCIUM: CPT

## 2022-01-01 PROCEDURE — 3080F DIAST BP >= 90 MM HG: CPT | Mod: ,,, | Performed by: NURSE PRACTITIONER

## 2022-01-01 PROCEDURE — 99214 OFFICE O/P EST MOD 30 MIN: CPT | Mod: PBBFAC | Performed by: NURSE PRACTITIONER

## 2022-01-01 PROCEDURE — 99213 OFFICE O/P EST LOW 20 MIN: CPT | Mod: ,,, | Performed by: NURSE PRACTITIONER

## 2022-01-01 PROCEDURE — 93010 ELECTROCARDIOGRAM REPORT: CPT | Mod: ,,, | Performed by: STUDENT IN AN ORGANIZED HEALTH CARE EDUCATION/TRAINING PROGRAM

## 2022-01-01 PROCEDURE — 3077F PR MOST RECENT SYSTOLIC BLOOD PRESSURE >= 140 MM HG: ICD-10-PCS | Mod: ,,, | Performed by: NURSE PRACTITIONER

## 2022-01-01 PROCEDURE — 83605 ASSAY OF LACTIC ACID: CPT | Performed by: NURSE PRACTITIONER

## 2022-01-01 PROCEDURE — 3078F PR MOST RECENT DIASTOLIC BLOOD PRESSURE < 80 MM HG: ICD-10-PCS | Mod: ,,, | Performed by: NURSE PRACTITIONER

## 2022-01-01 PROCEDURE — 84484 ASSAY OF TROPONIN QUANT: CPT | Performed by: NURSE PRACTITIONER

## 2022-01-01 PROCEDURE — 99214 PR OFFICE/OUTPT VISIT, EST, LEVL IV, 30-39 MIN: ICD-10-PCS | Mod: ,,, | Performed by: NURSE PRACTITIONER

## 2022-01-01 PROCEDURE — 80053 COMPREHEN METABOLIC PANEL: CPT | Mod: ,,, | Performed by: CLINICAL MEDICAL LABORATORY

## 2022-01-01 PROCEDURE — 82947 ASSAY GLUCOSE BLOOD QUANT: CPT

## 2022-01-01 PROCEDURE — 1159F PR MEDICATION LIST DOCUMENTED IN MEDICAL RECORD: ICD-10-PCS | Mod: ,,, | Performed by: FAMILY MEDICINE

## 2022-01-01 PROCEDURE — 1126F PR PAIN SEVERITY QUANTIFIED, NO PAIN PRESENT: ICD-10-PCS | Mod: ,,, | Performed by: NURSE PRACTITIONER

## 2022-01-01 PROCEDURE — 1126F PR PAIN SEVERITY QUANTIFIED, NO PAIN PRESENT: ICD-10-PCS | Mod: ,,, | Performed by: FAMILY MEDICINE

## 2022-01-01 PROCEDURE — P9016 RBC LEUKOCYTES REDUCED: HCPCS | Performed by: INTERNAL MEDICINE

## 2022-01-01 PROCEDURE — 96360 HYDRATION IV INFUSION INIT: CPT

## 2022-01-01 PROCEDURE — 1159F MED LIST DOCD IN RCRD: CPT | Mod: ,,, | Performed by: NURSE PRACTITIONER

## 2022-01-01 PROCEDURE — 99214 PR OFFICE/OUTPT VISIT, EST, LEVL IV, 30-39 MIN: ICD-10-PCS | Mod: S$PBB,,, | Performed by: NURSE PRACTITIONER

## 2022-01-01 PROCEDURE — 93005 ELECTROCARDIOGRAM TRACING: CPT

## 2022-01-01 PROCEDURE — 25000003 PHARM REV CODE 250: Performed by: INTERNAL MEDICINE

## 2022-01-01 PROCEDURE — 99213 PR OFFICE/OUTPT VISIT, EST, LEVL III, 20-29 MIN: ICD-10-PCS | Mod: ,,, | Performed by: NURSE PRACTITIONER

## 2022-01-01 PROCEDURE — 36430 TRANSFUSION BLD/BLD COMPNT: CPT

## 2022-01-01 PROCEDURE — 3078F DIAST BP <80 MM HG: CPT | Mod: CPTII,,, | Performed by: NURSE PRACTITIONER

## 2022-01-01 PROCEDURE — 3075F SYST BP GE 130 - 139MM HG: CPT | Mod: ,,, | Performed by: FAMILY MEDICINE

## 2022-01-01 PROCEDURE — 11000001 HC ACUTE MED/SURG PRIVATE ROOM

## 2022-01-01 PROCEDURE — 1160F PR REVIEW ALL MEDS BY PRESCRIBER/CLIN PHARMACIST DOCUMENTED: ICD-10-PCS | Mod: ,,, | Performed by: NURSE PRACTITIONER

## 2022-01-01 PROCEDURE — G0103 PSA, SCREENING: ICD-10-PCS | Mod: ,,, | Performed by: CLINICAL MEDICAL LABORATORY

## 2022-01-01 PROCEDURE — 99285 EMERGENCY DEPT VISIT HI MDM: CPT | Mod: ,,, | Performed by: NURSE PRACTITIONER

## 2022-01-01 PROCEDURE — 1160F PR REVIEW ALL MEDS BY PRESCRIBER/CLIN PHARMACIST DOCUMENTED: ICD-10-PCS | Mod: ,,, | Performed by: FAMILY MEDICINE

## 2022-01-01 PROCEDURE — 1101F PR PT FALLS ASSESS DOC 0-1 FALLS W/OUT INJ PAST YR: ICD-10-PCS | Mod: ,,, | Performed by: FAMILY MEDICINE

## 2022-01-01 PROCEDURE — 86923 COMPATIBILITY TEST ELECTRIC: CPT | Performed by: INTERNAL MEDICINE

## 2022-01-01 PROCEDURE — 3077F SYST BP >= 140 MM HG: CPT | Mod: ,,, | Performed by: NURSE PRACTITIONER

## 2022-01-01 PROCEDURE — 25000003 PHARM REV CODE 250: Performed by: NURSE PRACTITIONER

## 2022-01-01 PROCEDURE — 1126F AMNT PAIN NOTED NONE PRSNT: CPT | Mod: ,,, | Performed by: FAMILY MEDICINE

## 2022-01-01 PROCEDURE — 80061 LIPID PANEL: CPT | Mod: ,,, | Performed by: CLINICAL MEDICAL LABORATORY

## 2022-01-01 PROCEDURE — 36415 PR COLLECTION VENOUS BLOOD,VENIPUNCTURE: ICD-10-PCS | Mod: ,,, | Performed by: CLINICAL MEDICAL LABORATORY

## 2022-01-01 PROCEDURE — 93010 ELECTROCARDIOGRAM REPORT: CPT | Mod: S$PBB,,, | Performed by: INTERNAL MEDICINE

## 2022-01-01 PROCEDURE — 3288F FALL RISK ASSESSMENT DOCD: CPT | Mod: ,,, | Performed by: NURSE PRACTITIONER

## 2022-01-01 PROCEDURE — 82803 BLOOD GASES ANY COMBINATION: CPT

## 2022-01-01 PROCEDURE — 3078F PR MOST RECENT DIASTOLIC BLOOD PRESSURE < 80 MM HG: ICD-10-PCS | Mod: ,,, | Performed by: FAMILY MEDICINE

## 2022-01-01 PROCEDURE — 86900 BLOOD TYPING SEROLOGIC ABO: CPT | Performed by: NURSE PRACTITIONER

## 2022-01-01 PROCEDURE — 99285 PR EMERGENCY DEPT VISIT,LEVEL V: ICD-10-PCS | Mod: ,,, | Performed by: NURSE PRACTITIONER

## 2022-01-01 PROCEDURE — 3074F PR MOST RECENT SYSTOLIC BLOOD PRESSURE < 130 MM HG: ICD-10-PCS | Mod: ,,, | Performed by: NURSE PRACTITIONER

## 2022-01-01 PROCEDURE — 3078F DIAST BP <80 MM HG: CPT | Mod: ,,, | Performed by: FAMILY MEDICINE

## 2022-01-01 PROCEDURE — 99285 EMERGENCY DEPT VISIT HI MDM: CPT | Mod: 25

## 2022-01-01 PROCEDURE — 3075F PR MOST RECENT SYSTOLIC BLOOD PRESS GE 130-139MM HG: ICD-10-PCS | Mod: CPTII,,, | Performed by: NURSE PRACTITIONER

## 2022-01-01 PROCEDURE — 99214 OFFICE O/P EST MOD 30 MIN: CPT | Mod: ,,, | Performed by: NURSE PRACTITIONER

## 2022-01-01 PROCEDURE — 93010 EKG 12-LEAD: ICD-10-PCS | Mod: S$PBB,,, | Performed by: INTERNAL MEDICINE

## 2022-01-01 PROCEDURE — 3075F PR MOST RECENT SYSTOLIC BLOOD PRESS GE 130-139MM HG: ICD-10-PCS | Mod: ,,, | Performed by: NURSE PRACTITIONER

## 2022-01-01 PROCEDURE — 81003 URINALYSIS AUTO W/O SCOPE: CPT | Performed by: NURSE PRACTITIONER

## 2022-01-01 PROCEDURE — 3075F PR MOST RECENT SYSTOLIC BLOOD PRESS GE 130-139MM HG: ICD-10-PCS | Mod: ,,, | Performed by: FAMILY MEDICINE

## 2022-01-01 PROCEDURE — 1160F RVW MEDS BY RX/DR IN RCRD: CPT | Mod: ,,, | Performed by: FAMILY MEDICINE

## 2022-01-01 PROCEDURE — 1159F MED LIST DOCD IN RCRD: CPT | Mod: CPTII,,, | Performed by: NURSE PRACTITIONER

## 2022-01-01 PROCEDURE — 1160F RVW MEDS BY RX/DR IN RCRD: CPT | Mod: CPTII,,, | Performed by: NURSE PRACTITIONER

## 2022-01-01 PROCEDURE — 3080F PR MOST RECENT DIASTOLIC BLOOD PRESSURE >= 90 MM HG: ICD-10-PCS | Mod: ,,, | Performed by: NURSE PRACTITIONER

## 2022-01-01 PROCEDURE — 83036 HEMOGLOBIN A1C: ICD-10-PCS | Mod: GZ,,, | Performed by: CLINICAL MEDICAL LABORATORY

## 2022-01-01 PROCEDURE — 82272 OCCULT BLD FECES 1-3 TESTS: CPT

## 2022-01-01 PROCEDURE — 36415 COLL VENOUS BLD VENIPUNCTURE: CPT | Performed by: NURSE PRACTITIONER

## 2022-01-01 PROCEDURE — G0103 PSA SCREENING: HCPCS | Mod: ,,, | Performed by: CLINICAL MEDICAL LABORATORY

## 2022-01-01 PROCEDURE — G0439 PR MEDICARE ANNUAL WELLNESS SUBSEQUENT VISIT: ICD-10-PCS | Mod: ,,, | Performed by: FAMILY MEDICINE

## 2022-01-01 PROCEDURE — 87040 BLOOD CULTURE FOR BACTERIA: CPT | Performed by: NURSE PRACTITIONER

## 2022-01-01 RX ORDER — ROSUVASTATIN CALCIUM 5 MG/1
5 TABLET, COATED ORAL DAILY
Qty: 90 TABLET | Refills: 1 | Status: SHIPPED | OUTPATIENT
Start: 2022-01-01

## 2022-01-01 RX ORDER — PROPRANOLOL HYDROCHLORIDE 40 MG/1
40 TABLET ORAL 2 TIMES DAILY
Qty: 180 TABLET | Refills: 1 | Status: SHIPPED | OUTPATIENT
Start: 2022-01-01 | End: 2022-01-01 | Stop reason: SDUPTHER

## 2022-01-01 RX ORDER — ROSUVASTATIN CALCIUM 5 MG/1
5 TABLET, COATED ORAL DAILY
Qty: 90 TABLET | Refills: 1 | Status: SHIPPED | OUTPATIENT
Start: 2022-01-01 | End: 2022-01-01 | Stop reason: SDUPTHER

## 2022-01-01 RX ORDER — GLUCAGON 1 MG
1 KIT INJECTION
Status: DISCONTINUED | OUTPATIENT
Start: 2022-01-01 | End: 2023-01-01 | Stop reason: HOSPADM

## 2022-01-01 RX ORDER — CLOPIDOGREL BISULFATE 75 MG/1
1 TABLET ORAL DAILY
COMMUNITY
Start: 2021-08-02 | End: 2022-01-01 | Stop reason: SDUPTHER

## 2022-01-01 RX ORDER — CILOSTAZOL 50 MG/1
50 TABLET ORAL 2 TIMES DAILY
Qty: 180 TABLET | Refills: 1 | Status: SHIPPED | OUTPATIENT
Start: 2022-01-01

## 2022-01-01 RX ORDER — HYDROCODONE BITARTRATE AND ACETAMINOPHEN 500; 5 MG/1; MG/1
TABLET ORAL
Status: DISCONTINUED | OUTPATIENT
Start: 2022-01-01 | End: 2023-01-01 | Stop reason: HOSPADM

## 2022-01-01 RX ORDER — LANOLIN ALCOHOL/MO/W.PET/CERES
800 CREAM (GRAM) TOPICAL
Status: DISCONTINUED | OUTPATIENT
Start: 2022-01-01 | End: 2023-01-01 | Stop reason: HOSPADM

## 2022-01-01 RX ORDER — CLOPIDOGREL BISULFATE 75 MG/1
75 TABLET ORAL DAILY
Qty: 90 TABLET | Refills: 1 | Status: SHIPPED | OUTPATIENT
Start: 2022-01-01 | End: 2022-01-01

## 2022-01-01 RX ORDER — PANTOPRAZOLE SODIUM 40 MG/10ML
40 INJECTION, POWDER, LYOPHILIZED, FOR SOLUTION INTRAVENOUS DAILY
Status: DISCONTINUED | OUTPATIENT
Start: 2023-01-01 | End: 2023-01-01 | Stop reason: HOSPADM

## 2022-01-01 RX ORDER — SODIUM,POTASSIUM PHOSPHATES 280-250MG
2 POWDER IN PACKET (EA) ORAL
Status: DISCONTINUED | OUTPATIENT
Start: 2022-01-01 | End: 2023-01-01 | Stop reason: HOSPADM

## 2022-01-01 RX ORDER — LISINOPRIL 20 MG/1
20 TABLET ORAL DAILY
Qty: 90 TABLET | Refills: 1 | Status: SHIPPED | OUTPATIENT
Start: 2022-01-01 | End: 2022-01-01 | Stop reason: SDUPTHER

## 2022-01-01 RX ORDER — ACETAMINOPHEN 325 MG/1
650 TABLET ORAL EVERY 4 HOURS PRN
Status: DISCONTINUED | OUTPATIENT
Start: 2022-01-01 | End: 2023-01-01 | Stop reason: HOSPADM

## 2022-01-01 RX ORDER — VERAPAMIL HYDROCHLORIDE 240 MG/1
240 TABLET, FILM COATED, EXTENDED RELEASE ORAL DAILY
Qty: 90 TABLET | Refills: 1 | Status: SHIPPED | OUTPATIENT
Start: 2022-01-01 | End: 2022-01-01 | Stop reason: SDUPTHER

## 2022-01-01 RX ORDER — PROPRANOLOL HYDROCHLORIDE 40 MG/1
40 TABLET ORAL 2 TIMES DAILY
Qty: 180 TABLET | Refills: 0 | Status: SHIPPED | OUTPATIENT
Start: 2022-01-01 | End: 2022-01-01 | Stop reason: SDUPTHER

## 2022-01-01 RX ORDER — CLOPIDOGREL BISULFATE 75 MG/1
75 TABLET ORAL DAILY
Qty: 90 TABLET | Refills: 1 | Status: SHIPPED | OUTPATIENT
Start: 2022-01-01

## 2022-01-01 RX ORDER — INSULIN ASPART 100 [IU]/ML
0-5 INJECTION, SOLUTION INTRAVENOUS; SUBCUTANEOUS
Status: DISCONTINUED | OUTPATIENT
Start: 2022-01-01 | End: 2023-01-01 | Stop reason: HOSPADM

## 2022-01-01 RX ORDER — CILOSTAZOL 50 MG/1
50 TABLET ORAL 2 TIMES DAILY
Qty: 180 TABLET | Refills: 0 | Status: SHIPPED | OUTPATIENT
Start: 2022-01-01 | End: 2022-01-01 | Stop reason: SDUPTHER

## 2022-01-01 RX ORDER — HYDROCHLOROTHIAZIDE 25 MG/1
25 TABLET ORAL DAILY
Qty: 90 TABLET | Refills: 0 | Status: SHIPPED | OUTPATIENT
Start: 2022-01-01 | End: 2022-01-01 | Stop reason: SDUPTHER

## 2022-01-01 RX ORDER — CLOPIDOGREL BISULFATE 75 MG/1
75 TABLET ORAL DAILY
Qty: 90 TABLET | Refills: 0 | Status: SHIPPED | OUTPATIENT
Start: 2022-01-01 | End: 2022-01-01 | Stop reason: SDUPTHER

## 2022-01-01 RX ORDER — ACETAMINOPHEN 500 MG
1000 TABLET ORAL EVERY 8 HOURS PRN
Status: DISCONTINUED | OUTPATIENT
Start: 2022-01-01 | End: 2023-01-01 | Stop reason: HOSPADM

## 2022-01-01 RX ORDER — HYDROCHLOROTHIAZIDE 25 MG/1
25 TABLET ORAL DAILY
Qty: 90 TABLET | Refills: 1 | Status: SHIPPED | OUTPATIENT
Start: 2022-01-01 | End: 2022-01-01 | Stop reason: SDUPTHER

## 2022-01-01 RX ORDER — CILOSTAZOL 50 MG/1
50 TABLET ORAL 2 TIMES DAILY
Qty: 180 TABLET | Refills: 1 | Status: SHIPPED | OUTPATIENT
Start: 2022-01-01 | End: 2022-01-01 | Stop reason: SDUPTHER

## 2022-01-01 RX ORDER — LISINOPRIL 20 MG/1
20 TABLET ORAL DAILY
Qty: 90 TABLET | Refills: 1 | Status: SHIPPED | OUTPATIENT
Start: 2022-01-01

## 2022-01-01 RX ORDER — ROSUVASTATIN CALCIUM 5 MG/1
5 TABLET, COATED ORAL DAILY
Qty: 90 TABLET | Refills: 0 | Status: SHIPPED | OUTPATIENT
Start: 2022-01-01 | End: 2022-01-01 | Stop reason: SDUPTHER

## 2022-01-01 RX ORDER — POLYETHYLENE GLYCOL 3350 17 G/17G
17 POWDER, FOR SOLUTION ORAL DAILY
Status: DISCONTINUED | OUTPATIENT
Start: 2023-01-01 | End: 2023-01-01

## 2022-01-01 RX ORDER — PROPRANOLOL HYDROCHLORIDE 40 MG/1
40 TABLET ORAL 2 TIMES DAILY
Qty: 180 TABLET | Refills: 1 | Status: SHIPPED | OUTPATIENT
Start: 2022-01-01

## 2022-01-01 RX ORDER — VERAPAMIL HYDROCHLORIDE 240 MG/1
240 TABLET, FILM COATED, EXTENDED RELEASE ORAL DAILY
Qty: 90 TABLET | Refills: 1 | Status: SHIPPED | OUTPATIENT
Start: 2022-01-01

## 2022-01-01 RX ORDER — LISINOPRIL 20 MG/1
20 TABLET ORAL DAILY
Qty: 90 TABLET | Refills: 0 | Status: SHIPPED | OUTPATIENT
Start: 2022-01-01 | End: 2022-01-01 | Stop reason: SDUPTHER

## 2022-01-01 RX ORDER — SODIUM CHLORIDE 9 MG/ML
1000 INJECTION, SOLUTION INTRAVENOUS
Status: COMPLETED | OUTPATIENT
Start: 2022-01-01 | End: 2022-01-01

## 2022-01-01 RX ORDER — TALC
6 POWDER (GRAM) TOPICAL NIGHTLY PRN
Status: DISCONTINUED | OUTPATIENT
Start: 2022-01-01 | End: 2023-01-01 | Stop reason: HOSPADM

## 2022-01-01 RX ORDER — VERAPAMIL HYDROCHLORIDE 240 MG/1
240 TABLET, FILM COATED, EXTENDED RELEASE ORAL DAILY
Qty: 90 TABLET | Refills: 0 | Status: SHIPPED | OUTPATIENT
Start: 2022-01-01 | End: 2022-01-01 | Stop reason: SDUPTHER

## 2022-01-01 RX ORDER — NALOXONE HCL 0.4 MG/ML
0.02 VIAL (ML) INJECTION
Status: DISCONTINUED | OUTPATIENT
Start: 2022-01-01 | End: 2023-01-01 | Stop reason: HOSPADM

## 2022-01-01 RX ORDER — SODIUM CHLORIDE 0.9 % (FLUSH) 0.9 %
10 SYRINGE (ML) INJECTION
Status: DISCONTINUED | OUTPATIENT
Start: 2022-01-01 | End: 2023-01-01 | Stop reason: HOSPADM

## 2022-01-01 RX ORDER — HYDROCHLOROTHIAZIDE 25 MG/1
25 TABLET ORAL DAILY
Qty: 90 TABLET | Refills: 1 | Status: SHIPPED | OUTPATIENT
Start: 2022-01-01

## 2022-01-01 RX ADMIN — SODIUM CHLORIDE 1000 ML: 9 INJECTION, SOLUTION INTRAVENOUS at 01:12

## 2022-01-01 RX ADMIN — ACETAMINOPHEN 1000 MG: 500 TABLET ORAL at 09:12

## 2022-01-01 RX ADMIN — SODIUM CHLORIDE 1000 ML: 9 INJECTION, SOLUTION INTRAVENOUS at 03:12

## 2022-01-01 RX ADMIN — SODIUM CHLORIDE: 9 INJECTION, SOLUTION INTRAVENOUS at 06:12

## 2022-01-11 NOTE — PROGRESS NOTES
Clinic note     Patient name: Gama Cui is a 76 y.o. male   Chief compliant   Chief Complaint   Patient presents with    Medication Refill     3 month follow up  Denies any problems states he is feeling pretty good.        Subjective     History of present illness   In clinic for routine follow up and medication refills   Denies any acute concerns at present  Hx of CKD, followed by Dr Odom, follow up appointment scheduled for Jan 19  Hx of HTN, CAD, followed by TOYIN Casas for cardiology, next follow up scheduled February   Has had COVID vaccine two doses and booster dose   Declines influenza vaccine   Will have routine eye exam by Dr Blue later this month       Social History     Tobacco Use    Smoking status: Former Smoker     Packs/day: 0.50     Years: 35.00     Pack years: 17.50     Types: Cigarettes    Smokeless tobacco: Never Used    Tobacco comment: 8-10 per day   Substance Use Topics    Alcohol use: Yes     Comment: weekends    Drug use: Never         Past Medical History:   Diagnosis Date    Coronary artery disease     Hyperlipidemia     Hypertension     MI (myocardial infarction)     PVD (peripheral vascular disease)     Renal insufficiency      Review of patient's allergies indicates:  No Known Allergies    Past Surgical History:   Procedure Laterality Date    AMPUTATION OF REPLICATED TOES      VASCULAR SURGERY      leg bypass        Family History   Problem Relation Age of Onset    Heart disease Father          Current Outpatient Medications:     cilostazoL (PLETAL) 50 MG Tab, Take 1 tablet (50 mg total) by mouth 2 (two) times daily., Disp: 180 tablet, Rfl: 0    clopidogreL (PLAVIX) 75 mg tablet, Take 1 tablet (75 mg total) by mouth once daily., Disp: 90 tablet, Rfl: 0    hydroCHLOROthiazide (HYDRODIURIL) 25 MG tablet, Take 1 tablet (25 mg total) by mouth once daily., Disp: 90 tablet, Rfl: 0    lisinopriL (PRINIVIL,ZESTRIL) 20 MG tablet, Take 1 tablet (20 mg total) by  mouth once daily., Disp: 90 tablet, Rfl: 0    propranoloL (INDERAL) 40 MG tablet, Take 1 tablet (40 mg total) by mouth 2 (two) times daily., Disp: 180 tablet, Rfl: 0    rosuvastatin (CRESTOR) 5 MG tablet, Take 1 tablet (5 mg total) by mouth once daily., Disp: 90 tablet, Rfl: 0    verapamiL (CALAN-SR) 240 MG CR tablet, Take 1 tablet (240 mg total) by mouth once daily., Disp: 90 tablet, Rfl: 0    Review of Systems   Constitutional: Negative for activity change, appetite change, chills, fatigue, fever and unexpected weight change.   Eyes: Negative for visual disturbance.   Respiratory: Negative for cough and shortness of breath.    Cardiovascular: Negative for chest pain, palpitations and leg swelling.   Gastrointestinal: Negative for abdominal pain, blood in stool, change in bowel habit, constipation, diarrhea, nausea, vomiting and change in bowel habit.   Endocrine: Negative for polydipsia and polyuria.   Genitourinary: Negative for difficulty urinating and dysuria.   Musculoskeletal: Negative for arthralgias, gait problem and myalgias.   Integumentary:  Negative for rash and wound.   Neurological: Negative for dizziness, light-headedness, headaches, disturbances in coordination and coordination difficulties.   Psychiatric/Behavioral: Negative for confusion, dysphoric mood and sleep disturbance. The patient is not nervous/anxious.        Objective     /65 (BP Location: Left arm, Patient Position: Sitting)   Pulse (!) 58   Temp 96.9 °F (36.1 °C)   Ht 6' (1.829 m)   Wt 73 kg (161 lb)   SpO2 100%   BMI 21.84 kg/m²     Physical Exam   Constitutional: He is oriented to person, place, and time. No distress.   HENT:   Head: Atraumatic.   Eyes: Pupils are equal, round, and reactive to light. Conjunctivae are normal.   Cardiovascular: Normal rate and regular rhythm.   Pulmonary/Chest: Effort normal and breath sounds normal.   Abdominal: Soft. Normal appearance and bowel sounds are normal. There is no abdominal  tenderness.   Musculoskeletal:         General: Normal range of motion.      Cervical back: Neck supple.      Right lower leg: No edema.      Left lower leg: No edema.   Neurological: He is alert and oriented to person, place, and time. Gait normal.   Skin: Skin is warm and dry.   Psychiatric: His behavior is normal. Mood normal.       Lab Results   Component Value Date    WBC 4.46 (L) 07/06/2021    HGB 11.9 (L) 07/06/2021    HCT 37.2 (L) 07/06/2021    MCV 76.7 (L) 07/06/2021     07/06/2021       CMP  Sodium   Date Value Ref Range Status   07/06/2021 138 136 - 145 mmol/L Final     Potassium   Date Value Ref Range Status   07/06/2021 4.1 3.5 - 5.1 mmol/L Final     Chloride   Date Value Ref Range Status   07/06/2021 108 (H) 98 - 107 mmol/L Final     CO2   Date Value Ref Range Status   07/06/2021 22 21 - 32 mmol/L Final     Glucose   Date Value Ref Range Status   07/06/2021 121 (H) 74 - 106 mg/dL Final     BUN   Date Value Ref Range Status   07/06/2021 26 (H) 7 - 18 mg/dL Final     Creatinine   Date Value Ref Range Status   07/06/2021 2.33 (H) 0.70 - 1.30 mg/dL Final     Calcium   Date Value Ref Range Status   07/06/2021 9.6 8.5 - 10.1 mg/dL Final     Anion Gap   Date Value Ref Range Status   07/06/2021 12 7 - 16 mmol/L Final     eGFR    Date Value Ref Range Status   07/06/2021 35 (L) >=60 mL/min/1.73m² Final     Lab Results   Component Value Date    CHOL 217 (A) 01/13/2021     Lab Results   Component Value Date    HDL 53 01/13/2021     Lab Results   Component Value Date    LDLCALC 141 01/13/2021     Lab Results   Component Value Date    TRIG 116 01/13/2021     No results found for: CHOLHDL  Lab Results   Component Value Date    TSH 1.180 07/06/2021     No results found for: LABA1C, HGBA1C    Assessment and Plan   Coronary artery disease involving native coronary artery of native heart without angina pectoris  -     clopidogreL (PLAVIX) 75 mg tablet; Take 1 tablet (75 mg total) by mouth once  daily.  Dispense: 90 tablet; Refill: 0    Pure hypercholesterolemia  -     rosuvastatin (CRESTOR) 5 MG tablet; Take 1 tablet (5 mg total) by mouth once daily.  Dispense: 90 tablet; Refill: 0  -     Lipid Panel; Future; Expected date: 01/11/2022    Primary hypertension  -     verapamiL (CALAN-SR) 240 MG CR tablet; Take 1 tablet (240 mg total) by mouth once daily.  Dispense: 90 tablet; Refill: 0  -     propranoloL (INDERAL) 40 MG tablet; Take 1 tablet (40 mg total) by mouth 2 (two) times daily.  Dispense: 180 tablet; Refill: 0  -     lisinopriL (PRINIVIL,ZESTRIL) 20 MG tablet; Take 1 tablet (20 mg total) by mouth once daily.  Dispense: 90 tablet; Refill: 0  -     hydroCHLOROthiazide (HYDRODIURIL) 25 MG tablet; Take 1 tablet (25 mg total) by mouth once daily.  Dispense: 90 tablet; Refill: 0  -     CBC Auto Differential; Future; Expected date: 01/11/2022  -     Comprehensive Metabolic Panel; Future; Expected date: 01/11/2022    PVD (peripheral vascular disease)  -     cilostazoL (PLETAL) 50 MG Tab; Take 1 tablet (50 mg total) by mouth 2 (two) times daily.  Dispense: 180 tablet; Refill: 0    Screening for diabetes mellitus  -     Hemoglobin A1C; Future; Expected date: 01/11/2022          Patient Instructions  Patient Instructions   Lab obtained in clinic today, we will notify you of results and any necessary changes to plan of care     Advised to monitor BP at home. Advised on optimal BP readings - SBP < 130 & DBP < 80. Advised to call office for any persistent BP elevation and may have to prescribe or adjust BP med(s).  Recommended DASH diet, stay well hydrated with water daily, eliminate or decrease caffeinated and high calorie drinks, increase physical activity, and lose weight if BMI > 25.0.

## 2022-01-11 NOTE — PATIENT INSTRUCTIONS
Lab obtained in clinic today, we will notify you of results and any necessary changes to plan of care     Advised to monitor BP at home. Advised on optimal BP readings - SBP < 130 & DBP < 80. Advised to call office for any persistent BP elevation and may have to prescribe or adjust BP med(s).  Recommended DASH diet, stay well hydrated with water daily, eliminate or decrease caffeinated and high calorie drinks, increase physical activity, and lose weight if BMI > 25.0.

## 2022-03-31 NOTE — PROGRESS NOTES
[facility] note        DATE OF SERVICE: 03/31/2022       PCP: YONI Mcdaniels      CHIEF COMPLAINT:   Chief Complaint   Patient presents with    Follow-up     Patient denies any cardiac complaints today.        HISTORY OF PRESENT ILLNESS:  Gama Cui is a 76 y.o. male with a PMH of   Past Medical History:   Diagnosis Date    Coronary artery disease     Hyperlipidemia     Hypertension     MI (myocardial infarction)     PVD (peripheral vascular disease)     Renal insufficiency      who presents for routine follow up.   Chief Complaint   Patient presents with    Follow-up     Patient denies any cardiac complaints today.            PAST MEDICAL HISTORY:  Past Medical History:   Diagnosis Date    Coronary artery disease     Hyperlipidemia     Hypertension     MI (myocardial infarction)     PVD (peripheral vascular disease)     Renal insufficiency        PAST SURGICAL HISTORY:  Past Surgical History:   Procedure Laterality Date    AMPUTATION OF REPLICATED TOES      VASCULAR SURGERY      leg bypass       SOCIAL HISTORY:  Social History     Socioeconomic History    Marital status:    Tobacco Use    Smoking status: Former Smoker     Packs/day: 0.50     Years: 35.00     Pack years: 17.50     Types: Cigarettes    Smokeless tobacco: Never Used    Tobacco comment: 8-10 per day   Substance and Sexual Activity    Alcohol use: Yes     Comment: weekends    Drug use: Never       FAMILY HISTORY:  Family History   Problem Relation Age of Onset    Heart disease Father          ALLERGIES:  Review of patient's allergies indicates:  No Known Allergies     MEDICATIONS:    Current Outpatient Medications:     cilostazoL (PLETAL) 50 MG Tab, Take 1 tablet (50 mg total) by mouth 2 (two) times daily., Disp: 180 tablet, Rfl: 0    clopidogreL (PLAVIX) 75 mg tablet, Take 1 tablet (75 mg total) by mouth once daily., Disp: 90 tablet, Rfl: 0    hydroCHLOROthiazide (HYDRODIURIL) 25 MG tablet, Take  1 tablet (25 mg total) by mouth once daily., Disp: 90 tablet, Rfl: 0    lisinopriL (PRINIVIL,ZESTRIL) 20 MG tablet, Take 1 tablet (20 mg total) by mouth once daily., Disp: 90 tablet, Rfl: 0    propranoloL (INDERAL) 40 MG tablet, Take 1 tablet (40 mg total) by mouth 2 (two) times daily., Disp: 180 tablet, Rfl: 0    rosuvastatin (CRESTOR) 5 MG tablet, Take 1 tablet (5 mg total) by mouth once daily., Disp: 90 tablet, Rfl: 0    verapamiL (CALAN-SR) 240 MG CR tablet, Take 1 tablet (240 mg total) by mouth once daily., Disp: 90 tablet, Rfl: 0  Medications have been reviewed and reconciled using the list provided by the patient.     PHYSICAL EXAM:  /60 (BP Location: Left arm, Patient Position: Sitting)   Pulse (!) 57   Ht 6' (1.829 m)   Wt 72.1 kg (159 lb)   SpO2 99%   BMI 21.56 kg/m²   Wt Readings from Last 3 Encounters:   03/31/22 72.1 kg (159 lb)   01/11/22 73 kg (161 lb)   10/11/21 71.2 kg (157 lb)      Body mass index is 21.56 kg/m².    Physical Exam  Vitals and nursing note reviewed.   Constitutional:       Appearance: Normal appearance. He is normal weight.   HENT:      Head: Normocephalic and atraumatic.   Eyes:      Pupils: Pupils are equal, round, and reactive to light.   Neck:      Vascular: No carotid bruit.   Cardiovascular:      Rate and Rhythm: Normal rate and regular rhythm.      Pulses: Normal pulses.      Heart sounds: Normal heart sounds.   Pulmonary:      Effort: Pulmonary effort is normal.      Breath sounds: Normal breath sounds.   Abdominal:      General: Bowel sounds are normal.      Palpations: Abdomen is soft.   Musculoskeletal:      Cervical back: Neck supple.      Right lower leg: No edema.      Left lower leg: No edema.   Skin:     General: Skin is warm and dry.      Capillary Refill: Capillary refill takes less than 2 seconds.   Neurological:      General: No focal deficit present.      Mental Status: He is alert and oriented to person, place, and time.   Psychiatric:          Mood and Affect: Mood normal.         Behavior: Behavior normal.         LABS REVIEWED:  Lab Results   Component Value Date    WBC 4.76 01/11/2022    RBC 4.86 01/11/2022    HGB 11.9 (L) 01/11/2022    HCT 37.9 (L) 01/11/2022    MCV 78.0 (L) 01/11/2022    MCH 24.5 (L) 01/11/2022    MCHC 31.4 (L) 01/11/2022    RDW 15.4 (H) 01/11/2022     01/11/2022    MPV 11.8 01/11/2022    NRBC 0.0 01/11/2022     Lab Results   Component Value Date     01/11/2022    K 4.4 01/11/2022     01/11/2022    CO2 24 01/11/2022    BUN 27 (H) 01/11/2022     Lab Results   Component Value Date    AST 16 01/11/2022    ALT 17 01/11/2022     Lab Results   Component Value Date    GLU 98 01/11/2022    HGBA1C 5.6 01/11/2022     Lab Results   Component Value Date    CHOL 180 01/11/2022    HDL 49 01/11/2022    TRIG 195 (H) 01/11/2022    CHOLHDL 3.7 01/11/2022       CARDIAC STUDIES REVIEWED:EKG: sinus bradycardia; HR 59 bpm; inferior TWA; unchanged when compared to EKG 2/6/2019      ASSESSMENT:   Patient Active Problem List   Diagnosis    PVD (peripheral vascular disease)    MI (myocardial infarction)    Hypertension    Hyperlipidemia    Coronary artery disease            Problem List Items Addressed This Visit        Cardiac/Vascular    Coronary artery disease - Primary    Relevant Orders    EKG 12-lead           PLAN:  Orders Placed This Encounter   Procedures    EKG 12-lead     Standing Status:   Future     Number of Occurrences:   1     Standing Expiration Date:   3/31/2023      RTC: 1 year

## 2022-04-11 NOTE — PROGRESS NOTES
Clinic note     Patient name: Gama Cui is a 76 y.o. male   Chief compliant   Chief Complaint   Patient presents with    Medication Refill     Denies any problems at this time.        Subjective     History of present illness   In clinic for routine follow up and medication refills   Denies any acute concerns at present  Hx of CKD, followed by Dr Odom, states his last appointment was two weeks ago, denies any medication changes   Hx of HTN, CAD, followed by TOY[facility] Casas for cardiology, last clinic note reviewed  States he has not scheduled eye exam appointment as of yet; encouraged to schedule appointment       Social History     Tobacco Use    Smoking status: Former Smoker     Packs/day: 0.50     Years: 35.00     Pack years: 17.50     Types: Cigarettes    Smokeless tobacco: Never Used    Tobacco comment: 8-10 per day   Substance Use Topics    Alcohol use: Yes     Comment: weekends    Drug use: Never       Review of patient's allergies indicates:  No Known Allergies    Past Medical History:   Diagnosis Date    Coronary artery disease     Hyperlipidemia     Hypertension     MI (myocardial infarction)     PVD (peripheral vascular disease)     Renal insufficiency        Past Surgical History:   Procedure Laterality Date    AMPUTATION OF REPLICATED TOES      VASCULAR SURGERY      leg bypass        Family History   Problem Relation Age of Onset    Heart disease Father          Current Outpatient Medications:     cilostazoL (PLETAL) 50 MG Tab, Take 1 tablet (50 mg total) by mouth 2 (two) times daily., Disp: 180 tablet, Rfl: 1    clopidogreL (PLAVIX) 75 mg tablet, Take 1 tablet (75 mg total) by mouth once daily., Disp: 90 tablet, Rfl: 1    hydroCHLOROthiazide (HYDRODIURIL) 25 MG tablet, Take 1 tablet (25 mg total) by mouth once daily., Disp: 90 tablet, Rfl: 1    lisinopriL (PRINIVIL,ZESTRIL) 20 MG tablet, Take 1 tablet (20 mg total) by mouth once daily., Disp: 90 tablet, Rfl: 1     propranoloL (INDERAL) 40 MG tablet, Take 1 tablet (40 mg total) by mouth 2 (two) times daily., Disp: 180 tablet, Rfl: 1    rosuvastatin (CRESTOR) 5 MG tablet, Take 1 tablet (5 mg total) by mouth once daily., Disp: 90 tablet, Rfl: 1    verapamiL (CALAN-SR) 240 MG CR tablet, Take 1 tablet (240 mg total) by mouth once daily., Disp: 90 tablet, Rfl: 1    Review of Systems   Constitutional: Negative for activity change, appetite change, chills, fatigue, fever and unexpected weight change.   Eyes: Negative for visual disturbance.   Respiratory: Negative for cough and shortness of breath.    Cardiovascular: Negative for chest pain, palpitations and leg swelling.   Gastrointestinal: Negative for abdominal pain, blood in stool, change in bowel habit, constipation, diarrhea, nausea, vomiting and change in bowel habit.   Endocrine: Negative for polydipsia and polyuria.   Genitourinary: Negative for difficulty urinating and dysuria.   Musculoskeletal: Negative for arthralgias, gait problem and myalgias.   Integumentary:  Negative for rash and wound.   Neurological: Negative for dizziness, light-headedness, headaches, disturbances in coordination and coordination difficulties.   Psychiatric/Behavioral: Negative for confusion, dysphoric mood and sleep disturbance. The patient is not nervous/anxious.        Objective     BP (!) 138/59 (BP Method: Large (Manual))   Pulse 65   Temp 97.1 °F (36.2 °C)   Ht 6' (1.829 m)   Wt 70.3 kg (155 lb)   SpO2 98%   BMI 21.02 kg/m²     Physical Exam   Constitutional: He is oriented to person, place, and time. No distress.   HENT:   Head: Normocephalic and atraumatic.   Mouth/Throat: Mucous membranes are moist.   Eyes: Pupils are equal, round, and reactive to light. Conjunctivae are normal.   Cardiovascular: Normal rate and regular rhythm.   Pulmonary/Chest: Effort normal and breath sounds normal. No respiratory distress. He has no wheezes. He has no rhonchi. He has no rales.   Abdominal:  Soft. Normal appearance and bowel sounds are normal. He exhibits no distension. There is no abdominal tenderness.   Musculoskeletal:         General: Normal range of motion.      Cervical back: Normal range of motion and neck supple.      Right lower leg: No edema.      Left lower leg: No edema.   Neurological: He is alert and oriented to person, place, and time. Gait normal.   Skin: Skin is warm and dry.   Psychiatric: His behavior is normal. Mood normal.       Lab Results   Component Value Date    WBC 4.76 01/11/2022    HGB 11.9 (L) 01/11/2022    HCT 37.9 (L) 01/11/2022    MCV 78.0 (L) 01/11/2022     01/11/2022       CMP  Sodium   Date Value Ref Range Status   01/11/2022 136 136 - 145 mmol/L Final     Potassium   Date Value Ref Range Status   01/11/2022 4.4 3.5 - 5.1 mmol/L Final     Chloride   Date Value Ref Range Status   01/11/2022 107 98 - 107 mmol/L Final     CO2   Date Value Ref Range Status   01/11/2022 24 21 - 32 mmol/L Final     Glucose   Date Value Ref Range Status   01/11/2022 98 74 - 106 mg/dL Final     BUN   Date Value Ref Range Status   01/11/2022 27 (H) 7 - 18 mg/dL Final     Creatinine   Date Value Ref Range Status   01/11/2022 2.68 (H) 0.70 - 1.30 mg/dL Final     Calcium   Date Value Ref Range Status   01/11/2022 8.8 8.5 - 10.1 mg/dL Final     Total Protein   Date Value Ref Range Status   01/11/2022 8.1 6.4 - 8.2 g/dL Final     Albumin   Date Value Ref Range Status   01/11/2022 3.3 (L) 3.5 - 5.0 g/dL Final     Bilirubin, Total   Date Value Ref Range Status   01/11/2022 0.3 0.0 - 1.2 mg/dL Final     Alk Phos   Date Value Ref Range Status   01/11/2022 67 45 - 115 U/L Final     AST   Date Value Ref Range Status   01/11/2022 16 15 - 37 U/L Final     ALT   Date Value Ref Range Status   01/11/2022 17 16 - 61 U/L Final     Anion Gap   Date Value Ref Range Status   01/11/2022 9 7 - 16 mmol/L Final     eGFR    Date Value Ref Range Status   07/06/2021 35 (L) >=60 mL/min/1.73m² Final      eGFR   Date Value Ref Range Status   01/11/2022 25 (L) >=60 mL/min/1.73m² Final     Lab Results   Component Value Date    TSH 1.180 07/06/2021     Lab Results   Component Value Date    CHOL 180 01/11/2022    CHOL 217 (A) 01/13/2021     Lab Results   Component Value Date    HDL 49 01/11/2022    HDL 53 01/13/2021     Lab Results   Component Value Date    LDLCALC 92 01/11/2022    LDLCALC 141 01/13/2021     Lab Results   Component Value Date    TRIG 195 (H) 01/11/2022    TRIG 116 01/13/2021     Lab Results   Component Value Date    CHOLHDL 3.7 01/11/2022     Lab Results   Component Value Date    HGBA1C 5.6 01/11/2022         Assessment and Plan   Primary hypertension  -     verapamiL (CALAN-SR) 240 MG CR tablet; Take 1 tablet (240 mg total) by mouth once daily.  Dispense: 90 tablet; Refill: 1  -     propranoloL (INDERAL) 40 MG tablet; Take 1 tablet (40 mg total) by mouth 2 (two) times daily.  Dispense: 180 tablet; Refill: 1  -     lisinopriL (PRINIVIL,ZESTRIL) 20 MG tablet; Take 1 tablet (20 mg total) by mouth once daily.  Dispense: 90 tablet; Refill: 1  -     hydroCHLOROthiazide (HYDRODIURIL) 25 MG tablet; Take 1 tablet (25 mg total) by mouth once daily.  Dispense: 90 tablet; Refill: 1    Pure hypercholesterolemia  -     rosuvastatin (CRESTOR) 5 MG tablet; Take 1 tablet (5 mg total) by mouth once daily.  Dispense: 90 tablet; Refill: 1    PVD (peripheral vascular disease)  -     cilostazoL (PLETAL) 50 MG Tab; Take 1 tablet (50 mg total) by mouth 2 (two) times daily.  Dispense: 180 tablet; Refill: 1    Coronary artery disease involving native coronary artery of native heart without angina pectoris  -     clopidogreL (PLAVIX) 75 mg tablet; Take 1 tablet (75 mg total) by mouth once daily.  Dispense: 90 tablet; Refill: 1    Screening for prostate cancer          Patient Instructions  Patient Instructions     Refills on routine medications     Follow up in six  months and as needed

## 2022-07-26 NOTE — PROGRESS NOTES
[place] AWV [facility]      PATIENT NAME: Gama Cui   : 1945    AGE: 76 y.o. DATE: 2022   MRN: [id number]        Reason for Visit / Chief Complaint: Medicare AWV (Subsequent [employer] AWV )        Gama Cui presents for a Subsequent [employer] AWV today.     The following components were reviewed and updated:    Medical/Social/Family History:  Past Medical History:   Diagnosis Date    Coronary artery disease     Hyperlipidemia     Hypertension     MI (myocardial infarction)     PVD (peripheral vascular disease)     Renal insufficiency         Family History   Problem Relation Age of Onset    Hypertension Mother     Heart disease Father     Diabetes Sister     Diabetes Brother         Past Surgical History:   Procedure Laterality Date    AMPUTATION OF REPLICATED TOES      VASCULAR SURGERY      leg bypass       Social History     Tobacco Use   Smoking Status Former Smoker    Packs/day: 0.50    Years: 35.00    Pack years: 17.50    Types: Cigarettes   Smokeless Tobacco Never Used   Tobacco Comment    8-10 per day       Social History     Substance and Sexual Activity   Alcohol Use Yes    Comment: weekends         · Allergies and Current Medications   Review of patient's allergies indicates:  No Known Allergies    Current Outpatient Medications:     cilostazoL (PLETAL) 50 MG Tab, Take 1 tablet (50 mg total) by mouth 2 (two) times daily., Disp: 180 tablet, Rfl: 1    clopidogreL (PLAVIX) 75 mg tablet, Take 1 tablet by mouth once daily at 6am., Disp: , Rfl:     hydroCHLOROthiazide (HYDRODIURIL) 25 MG tablet, Take 1 tablet (25 mg total) by mouth once daily., Disp: 90 tablet, Rfl: 1    lisinopriL (PRINIVIL,ZESTRIL) 20 MG tablet, Take 1 tablet (20 mg total) by mouth once daily., Disp: 90 tablet, Rfl: 1    propranoloL (INDERAL) 40 MG tablet, Take 1 tablet (40 mg total) by mouth 2 (two) times daily., Disp: 180 tablet, Rfl: 1    rosuvastatin (CRESTOR) 5 MG  tablet, Take 1 tablet (5 mg total) by mouth once daily., Disp: 90 tablet, Rfl: 1    verapamiL (CALAN-SR) 240 MG CR tablet, Take 1 tablet (240 mg total) by mouth once daily., Disp: 90 tablet, Rfl: 1      · Health Risk Assessment   Fall Risk: no   Obesity: BMI Body mass index is 20.48 kg/m².   Advance Directive:  Does not have an advanced directive.  Verbal information given and written information provided on today's AVS.   Depression: PHQ9- 0   HTN:  DASH diet, exercise, weight management, med compliance, home BP monitoring, and follow-up discussed.  STI: not at risk   Statin Use: yes      · Health Maintenance   Last eye exam: years ago with Dr. Blue   Last CV screen with lipids: 01/11/2022   Diabetes screening with fasting glucose or A1c: 01/11/2022   DEXA: NA              Last PSA screen: 03/30/2020  Advanced age  Colonoscopy: years ago at [facility] referral   Flu Vaccine: declines   Pneumonia vaccines: declines   COVID vaccine: 03/04/2021 04/01/2021 12/09/2021    Hep B vaccine: NA  AAA screening: NA advanced age   HIV Screening: not at risk  Hepatitis C Screen: not at risk  Low Dose CT Scan: NA  Advanced age    Health Maintenance Topics with due status: Not Due       Topic Last Completion Date    Lipid Panel 01/11/2022    Aspirin/Antiplatelet Therapy 07/27/2022     Health Maintenance Due   Topic Date Due    COVID-19 Vaccine (4 - Booster for Moderna series) 04/09/2022         Lab results available in Epic or see [facility] from [facility] above:   Lab Results   Component Value Date    CHOL 180 01/11/2022    CHOL 217 (A) 01/13/2021     Lab Results   Component Value Date    HDL 49 01/11/2022    HDL 53 01/13/2021     Lab Results   Component Value Date    LDLCALC 92 01/11/2022    LDLCALC 141 01/13/2021     Lab Results   Component Value Date    TRIG 195 (H) 01/11/2022    TRIG 116 01/13/2021         Lab Results   Component Value Date    HGBA1C 5.6 01/11/2022       Sodium   Date Value Ref Range Status   01/11/2022 136  136 - 145 mmol/L Final     Potassium   Date Value Ref Range Status   01/11/2022 4.4 3.5 - 5.1 mmol/L Final     Chloride   Date Value Ref Range Status   01/11/2022 107 98 - 107 mmol/L Final     CO2   Date Value Ref Range Status   01/11/2022 24 21 - 32 mmol/L Final     Glucose   Date Value Ref Range Status   01/11/2022 98 74 - 106 mg/dL Final     BUN   Date Value Ref Range Status   01/11/2022 27 (H) 7 - 18 mg/dL Final     Creatinine   Date Value Ref Range Status   01/11/2022 2.68 (H) 0.70 - 1.30 mg/dL Final     Calcium   Date Value Ref Range Status   01/11/2022 8.8 8.5 - 10.1 mg/dL Final     Total Protein   Date Value Ref Range Status   01/11/2022 8.1 6.4 - 8.2 g/dL Final     Albumin   Date Value Ref Range Status   01/11/2022 3.3 (L) 3.5 - 5.0 g/dL Final     Bilirubin, Total   Date Value Ref Range Status   01/11/2022 0.3 0.0 - 1.2 mg/dL Final     Alk Phos   Date Value Ref Range Status   01/11/2022 67 45 - 115 U/L Final     AST   Date Value Ref Range Status   01/11/2022 16 15 - 37 U/L Final     ALT   Date Value Ref Range Status   01/11/2022 17 16 - 61 U/L Final     Anion Gap   Date Value Ref Range Status   01/11/2022 9 7 - 16 mmol/L Final     eGFR    Date Value Ref Range Status   07/06/2021 35 (L) >=60 mL/min/1.73m² Final     eGFR   Date Value Ref Range Status   01/11/2022 25 (L) >=60 mL/min/1.73m² Final         Lab Results   Component Value Date    PSA 2.61 03/30/2020          Incontinence  Bowel: no  Bladder: no      · Care Team:  JF VALLEJO  PCP      Dr. Fry  Cardiology      Dr. Romano  Nephrology          **See Completed Assessments for Annual Wellness visit within the encounter summary    The following assessments were completed & reviewed:  · Depression Screening  · Cognitive function Screening  · Timed Get Up Test  · Whisper Test  · Vision Screen  · Health Risk Assessment  · Checklist of ADLs and IADLs        Objective  Vitals:    07/27/22 1032 07/27/22 1041   BP: (!) 153/80 138/74    Pulse: (!) 52    Resp: 12    Temp: 97.9 °F (36.6 °C)    TempSrc: Oral    SpO2: 99%    Weight: 68.5 kg (151 lb)    Height: 6' (1.829 m)    PainSc: 0-No pain       Body mass index is 20.48 kg/m².  Ideal body weight: 77.6 kg (171 lb 1.2 oz)       Physical Exam  Vitals reviewed.   Constitutional:       Appearance: Normal appearance.   HENT:      Head: Normocephalic and atraumatic.   Eyes:      Extraocular Movements: Extraocular movements intact.      Conjunctiva/sclera: Conjunctivae normal.      Pupils: Pupils are equal, round, and reactive to light.   Cardiovascular:      Rate and Rhythm: Normal rate and regular rhythm.      Heart sounds: Normal heart sounds.   Pulmonary:      Effort: Pulmonary effort is normal.      Breath sounds: Normal breath sounds.   Musculoskeletal:         General: Normal range of motion.      Cervical back: Normal range of motion and neck supple.   Skin:     General: Skin is warm and dry.   Neurological:      General: No focal deficit present.      Mental Status: He is alert and oriented to person, place, and time.   Psychiatric:         Mood and Affect: Mood normal.         Behavior: Behavior normal.           Assessment:     1. Encounter for subsequent annual wellness visit (AWV) in Medicare patient    2. Essential hypertension    3. Pure hypercholesterolemia    4. Coronary artery disease involving native coronary artery of native heart without angina pectoris    5. Stage 3 chronic kidney disease, unspecified whether stage 3a or 3b CKD    6. PVD (peripheral vascular disease)    7. BMI 20.0-20.9, adult       Problem List Items Addressed This Visit        Cardiac/Vascular    PVD (peripheral vascular disease)    Essential hypertension    Hyperlipidemia    Overview     Followed by PCP in [place]           Coronary artery disease       Renal/    Stage 3 chronic kidney disease (Chronic)      Other Visit Diagnoses     Encounter for subsequent annual wellness visit (AWV) in Medicare patient    -   Primary    BMI 20.0-20.9, adult              Plan:    Referrals:   Pt declines           Discussed and provided with a screening schedule and personal prevention plan in accordance with USPSTF age appropriate recommendations and Medicare screening guidelines.   Education, counseling, and referrals were provided as needed.  After Visit Summary printed and given to patient which includes written education and a list of any referrals if indicated.     Education including diet, exercise, falls and advanced directives discussed with patient and patient verbalized understanding.      F/u plan for yearly AWV.    Signature: Dr. Wali Sands MD

## 2022-07-27 PROBLEM — N18.30 STAGE 3 CHRONIC KIDNEY DISEASE: Chronic | Status: ACTIVE | Noted: 2022-01-01

## 2022-07-27 NOTE — PATIENT INSTRUCTIONS
Counseling and Referral of Other Preventative  (Italic type indicates deductible and co-insurance are waived)    Patient Name: Gama Cui  Today's Date: 7/27/2022    Health Maintenance         Date Due Completion Date    Aspirin/Antiplatelet Therapy 09[birth year]/1961 7/27/2022    COVID-19 Vaccine (4 - Booster for Moderna series) 04/09/2022 12/9/2021    Hepatitis C Screening 10/11/2022 (Originally 1945) ---    TETANUS VACCINE 10/14/2022 (Originally 9/27/1963) ---    Shingles Vaccine (1 of 2) 10/14/2022 (Originally 9/27/1993) ---    Pneumococcal Vaccines (Age 65+) (1 - PCV) 10/14/2022 (Originally 9/27/2008) ---    Lipid Panel 01/11/2023 1/11/2022    Override on 1/13/2021: Done          No orders of the defined types were placed in this encounter.

## 2022-10-11 NOTE — PATIENT INSTRUCTIONS
Lab obtained in clinic today, we will notify you of results and any necessary changes to plan of care   Refills on routine medications   Follow up in six months and as needed     Advised to monitor BP at home and keep log, bring log in to all clinic visits .  Advised on optimal BP readings - SBP < 130 & DBP < 80.  If readings are consistently greater than 140/90 schedule follow up visit in clinic   Recommended DASH diet, stay well hydrated with water daily, eliminate or decrease caffeinated and high calorie drinks, increase physical activity, and lose weight if BMI > 25.0.

## 2022-10-11 NOTE — PROGRESS NOTES
Clinic note     Patient name: Gama Cui is a 77 y.o. male   Chief compliant   Chief Complaint   Patient presents with    Follow-up    Medication Refill     No problems at thsi time.        Subjective     History of present illness   In clinic for routine follow up and medication refills   Denies any acute concerns at present  Hx of CKD, followed by Dr Odom, follow up scheduled for Feb 2023  Hx of HTN, CAD, followed by TOYIN Casas for cardiology, last clinic note reviewed; follow up scheduled for April 2023  Blood pressure is elevated in clinic today, he states it is always elevated when he is in this clinic, but is normal all other times it is checked   Denies any chest pain, palpitations, dizziness, headache, or shortness of breath   Blood pressure readings: 1/11/22 125/65; 3/31/22 136/60; and 4/11/22 138/59        Social History     Tobacco Use    Smoking status: Former     Packs/day: 0.50     Years: 35.00     Pack years: 17.50     Types: Cigarettes    Smokeless tobacco: Never    Tobacco comments:     8-10 per day   Substance Use Topics    Alcohol use: Yes     Comment: weekends    Drug use: Never       Review of patient's allergies indicates:   Allergen Reactions    Naproxen sodium        Past Medical History:   Diagnosis Date    Coronary artery disease     Hyperlipidemia     Hypertension     MI (myocardial infarction)     PVD (peripheral vascular disease)     Renal insufficiency        Past Surgical History:   Procedure Laterality Date    AMPUTATION OF REPLICATED TOES      VASCULAR SURGERY      leg bypass        Family History   Problem Relation Age of Onset    Hypertension Mother     Heart disease Father     Diabetes Sister     Diabetes Brother          Current Outpatient Medications:     clopidogreL (PLAVIX) 75 mg tablet, Take 1 tablet by mouth once daily at 6am., Disp: , Rfl:     cilostazoL (PLETAL) 50 MG Tab, Take 1 tablet (50 mg total) by mouth 2 (two) times daily., Disp: 180 tablet, Rfl: 1     hydroCHLOROthiazide (HYDRODIURIL) 25 MG tablet, Take 1 tablet (25 mg total) by mouth once daily., Disp: 90 tablet, Rfl: 1    lisinopriL (PRINIVIL,ZESTRIL) 20 MG tablet, Take 1 tablet (20 mg total) by mouth once daily., Disp: 90 tablet, Rfl: 1    propranoloL (INDERAL) 40 MG tablet, Take 1 tablet (40 mg total) by mouth 2 (two) times daily., Disp: 180 tablet, Rfl: 1    rosuvastatin (CRESTOR) 5 MG tablet, Take 1 tablet (5 mg total) by mouth once daily., Disp: 90 tablet, Rfl: 1    verapamiL (CALAN-SR) 240 MG CR tablet, Take 1 tablet (240 mg total) by mouth once daily., Disp: 90 tablet, Rfl: 1    Review of Systems   Constitutional:  Negative for activity change, appetite change, chills, fatigue, fever and unexpected weight change.   Eyes:  Negative for visual disturbance.   Respiratory:  Negative for cough and shortness of breath.    Cardiovascular:  Negative for chest pain, palpitations and leg swelling.   Gastrointestinal:  Negative for abdominal pain, blood in stool, change in bowel habit, constipation, diarrhea, nausea, vomiting and change in bowel habit.   Endocrine: Negative for polydipsia and polyuria.   Genitourinary:  Negative for difficulty urinating and dysuria.   Musculoskeletal:  Negative for arthralgias, gait problem and myalgias.   Integumentary:  Negative for rash and wound.   Neurological:  Negative for dizziness, light-headedness, headaches, coordination difficulties and coordination difficulties.   Psychiatric/Behavioral:  Negative for confusion, dysphoric mood and sleep disturbance. The patient is not nervous/anxious.      Objective     BP (!) 205/96 (BP Method: Large (Automatic))   Pulse 71   Ht 6' (1.829 m)   Wt 67.1 kg (148 lb)   SpO2 97%   BMI 20.07 kg/m²         Physical Exam   Constitutional: He is oriented to person, place, and time. normal appearance. No distress.   HENT:   Head: Atraumatic.   Mouth/Throat: Mucous membranes are moist.   Eyes: Pupils are equal, round, and reactive to light.  Conjunctivae are normal.   Cardiovascular: Normal rate and regular rhythm. Pulmonary:      Effort: Pulmonary effort is normal. No respiratory distress.      Breath sounds: Normal breath sounds. No wheezing, rhonchi or rales.     Abdominal: Soft. Normal appearance and bowel sounds are normal. He exhibits no distension. There is no abdominal tenderness.   Musculoskeletal:         General: Normal range of motion.      Cervical back: Neck supple.      Right lower leg: No edema.      Left lower leg: No edema.   Neurological: He is alert and oriented to person, place, and time. Gait normal.   Skin: Skin is warm and dry.   Psychiatric: His behavior is normal. Mood normal.     Lab Results   Component Value Date    WBC 4.76 01/11/2022    HGB 11.9 (L) 01/11/2022    HCT 37.9 (L) 01/11/2022    MCV 78.0 (L) 01/11/2022     01/11/2022       CMP  Sodium   Date Value Ref Range Status   01/11/2022 136 136 - 145 mmol/L Final     Potassium   Date Value Ref Range Status   01/11/2022 4.4 3.5 - 5.1 mmol/L Final     Chloride   Date Value Ref Range Status   01/11/2022 107 98 - 107 mmol/L Final     CO2   Date Value Ref Range Status   01/11/2022 24 21 - 32 mmol/L Final     Glucose   Date Value Ref Range Status   01/11/2022 98 74 - 106 mg/dL Final     BUN   Date Value Ref Range Status   01/11/2022 27 (H) 7 - 18 mg/dL Final     Creatinine   Date Value Ref Range Status   01/11/2022 2.68 (H) 0.70 - 1.30 mg/dL Final     Calcium   Date Value Ref Range Status   01/11/2022 8.8 8.5 - 10.1 mg/dL Final     Total Protein   Date Value Ref Range Status   01/11/2022 8.1 6.4 - 8.2 g/dL Final     Albumin   Date Value Ref Range Status   01/11/2022 3.3 (L) 3.5 - 5.0 g/dL Final     Bilirubin, Total   Date Value Ref Range Status   01/11/2022 0.3 0.0 - 1.2 mg/dL Final     Alk Phos   Date Value Ref Range Status   01/11/2022 67 45 - 115 U/L Final     AST   Date Value Ref Range Status   01/11/2022 16 15 - 37 U/L Final     ALT   Date Value Ref Range Status    01/11/2022 17 16 - 61 U/L Final     Anion Gap   Date Value Ref Range Status   01/11/2022 9 7 - 16 mmol/L Final     eGFR    Date Value Ref Range Status   07/06/2021 35 (L) >=60 mL/min/1.73m² Final     eGFR   Date Value Ref Range Status   01/11/2022 25 (L) >=60 mL/min/1.73m² Final     Lab Results   Component Value Date    TSH 1.180 07/06/2021     Lab Results   Component Value Date    CHOL 180 01/11/2022    CHOL 217 (A) 01/13/2021     Lab Results   Component Value Date    HDL 49 01/11/2022    HDL 53 01/13/2021     Lab Results   Component Value Date    LDLCALC 92 01/11/2022    LDLCALC 141 01/13/2021     Lab Results   Component Value Date    TRIG 195 (H) 01/11/2022    TRIG 116 01/13/2021     Lab Results   Component Value Date    CHOLHDL 3.7 01/11/2022     Lab Results   Component Value Date    HGBA1C 5.6 01/11/2022         Assessment and Plan   Essential hypertension  -     Comprehensive Metabolic Panel; Future; Expected date: 10/11/2022  -     CBC Auto Differential; Future; Expected date: 10/11/2022  -     hydroCHLOROthiazide (HYDRODIURIL) 25 MG tablet; Take 1 tablet (25 mg total) by mouth once daily.  Dispense: 90 tablet; Refill: 1  -     lisinopriL (PRINIVIL,ZESTRIL) 20 MG tablet; Take 1 tablet (20 mg total) by mouth once daily.  Dispense: 90 tablet; Refill: 1  -     propranoloL (INDERAL) 40 MG tablet; Take 1 tablet (40 mg total) by mouth 2 (two) times daily.  Dispense: 180 tablet; Refill: 1  -     verapamiL (CALAN-SR) 240 MG CR tablet; Take 1 tablet (240 mg total) by mouth once daily.  Dispense: 90 tablet; Refill: 1    Coronary artery disease involving native coronary artery of native heart without angina pectoris    Stage 3 chronic kidney disease, unspecified whether stage 3a or 3b CKD    Pure hypercholesterolemia  -     rosuvastatin (CRESTOR) 5 MG tablet; Take 1 tablet (5 mg total) by mouth once daily.  Dispense: 90 tablet; Refill: 1    PVD (peripheral vascular disease)  -     cilostazoL (PLETAL)  50 MG Tab; Take 1 tablet (50 mg total) by mouth 2 (two) times daily.  Dispense: 180 tablet; Refill: 1    Screening for prostate cancer  -     PSA, Screening; Future; Expected date: 10/11/2022          Patient Instructions  Patient Instructions   Lab obtained in clinic today, we will notify you of results and any necessary changes to plan of care   Refills on routine medications   Follow up in six months and as needed     Advised to monitor BP at home and keep log, bring log in to all clinic visits .  Advised on optimal BP readings - SBP < 130 & DBP < 80.  If readings are consistently greater than 140/90 schedule follow up visit in clinic   Recommended DASH diet, stay well hydrated with water daily, eliminate or decrease caffeinated and high calorie drinks, increase physical activity, and lose weight if BMI > 25.0.

## 2022-12-31 NOTE — ED PROVIDER NOTES
Encounter Date: 12/31/2022       History     Chief Complaint   Patient presents with    Hypoglycemia     76 y/o AAM with PMH of HLD, CAD and MI, HTN, PVD and CKD presents to the emergency department via EMS with c/o generally not feeling well. States he has had decreased appetite for the past few days but has been drinking a lot of water. Otherwise, he has no specific complaints. Denies fevers, chills, cough, shortness of breath, chest pain, nausea, vomiting, diarrhea or dysuria. Denies melena, hematochezia or abd pain. EMS reports he was found to have a glucose of 41. He was given 1 amp of D50 and his glucose improved to 83 on arrival.    The history is provided by the patient and the EMS personnel. Dictation #1  MRN:[id number]  CSN:[id number]   Review of patient's allergies indicates:   Allergen Reactions    Naproxen sodium      Past Medical History:   Diagnosis Date    Coronary artery disease     Hyperlipidemia     Hypertension     MI (myocardial infarction)     PVD (peripheral vascular disease)     Renal insufficiency      Past Surgical History:   Procedure Laterality Date    AMPUTATION OF REPLICATED TOES      VASCULAR SURGERY      leg bypass     Family History   Problem Relation Age of Onset    Hypertension Mother     Heart disease Father     Diabetes Sister     Diabetes Brother      Social History     Tobacco Use    Smoking status: Former     Packs/day: 0.50     Years: 35.00     Pack years: 17.50     Types: Cigarettes    Smokeless tobacco: Never    Tobacco comments:     8-10 per day   Substance Use Topics    Alcohol use: Yes     Comment: weekends    Drug use: Never     Review of Systems   Constitutional:  Positive for appetite change. Negative for chills and fever.   HENT:  Negative for congestion and rhinorrhea.    Respiratory:  Negative for cough and shortness of breath.    Cardiovascular:  Negative for chest pain and palpitations.   Gastrointestinal:  Negative for abdominal pain, constipation, diarrhea, nausea  and vomiting.   Genitourinary:  Negative for dysuria and hematuria.   Neurological:  Negative for dizziness and headaches.   All other systems reviewed and are negative.    Physical Exam     Initial Vitals [12/31/22 1204]   BP Pulse Resp Temp SpO2   (!) 139/49 70 13 97.4 °F (36.3 °C) --      MAP       --         Physical Exam    Constitutional: He appears well-developed and well-nourished. He is cooperative.  Non-toxic appearance.   Eyes: Pupils are equal, round, and reactive to light.   Cardiovascular:  Normal rate, regular rhythm, normal heart sounds and normal pulses.           Pulmonary/Chest: Effort normal and breath sounds normal.   Abdominal: Abdomen is soft. Bowel sounds are normal. There is no abdominal tenderness.     Neurological: He is alert and oriented to person, place, and time. GCS eye subscore is 4. GCS verbal subscore is 5. GCS motor subscore is 6.   Skin: Skin is warm and intact. Capillary refill takes 2 to 3 seconds.       Medical Screening Exam   See Full Note    ED Course   Procedures  Labs Reviewed   URINALYSIS, REFLEX TO URINE CULTURE - Abnormal; Notable for the following components:       Result Value    Protein,  (*)     All other components within normal limits   COMPREHENSIVE METABOLIC PANEL - Abnormal; Notable for the following components:    Chloride 108 (*)     CO2 17 (*)     Anion Gap 19 (*)     Glucose 137 (*)     BUN 86 (*)     Creatinine 5.20 (*)     Globulin 4.2 (*)     ALT 15 (*)     eGFR 11 (*)     All other components within normal limits   LACTIC ACID, PLASMA - Abnormal; Notable for the following components:    Lactic Acid 3.5 (*)     All other components within normal limits   CBC WITH DIFFERENTIAL - Abnormal; Notable for the following components:    RBC 3.52 (*)     Hemoglobin 7.8 (*)     Hematocrit 25.4 (*)     MCV 72.2 (*)     MCH 22.2 (*)     MCHC 30.7 (*)     RDW 15.1 (*)     Neutrophils % 87.5 (*)     Lymphocytes % 3.5 (*)     Monocytes % 8.6 (*)     Eosinophils %  0.0 (*)     nRBC, Auto 1.1 (*)     Lymphocytes, Absolute 0.20 (*)     nRBC, Absolute 0.06 (*)     All other components within normal limits   MANUAL DIFFERENTIAL - Abnormal; Notable for the following components:    Segmented Neutrophils, Man % 89 (*)     Lymphocytes, Man % 2 (*)     Platelet Morphology Few Large Platelets (*)     All other components within normal limits   TROPONIN I - Normal   LACTIC ACID, PLASMA - Normal   CULTURE, BLOOD   CULTURE, BLOOD   CBC W/ AUTO DIFFERENTIAL    Narrative:     The following orders were created for panel order CBC auto differential.  Procedure                               Abnormality         Status                     ---------                               -----------         ------                     CBC with Differential[[id number]]        Abnormal            Final result               Manual Differential[[id number]]          Abnormal            Final result                 Please view results for these tests on the individual orders.   TYPE & SCREEN   POCT GLUCOSE MONITORING CONTINUOUS   POCT OCCULT BLOOD (STOOL)   POCT GLUCOSE MONITORING CONTINUOUS          Imaging Results              X-Ray Chest AP Portable (Final result)  Result time 12/31/22 12:45:28      Final result by Faisal Art MD (12/31/22 12:45:28)                   Impression:      No acute findings.      Electronically signed by: Faisal Art  Date:    12/31/2022  Time:    12:45               Narrative:    EXAMINATION:  XR CHEST AP PORTABLE    CLINICAL HISTORY:  Weakness    TECHNIQUE:  Single frontal view of the chest was performed.    COMPARISON:  6/9/2015    FINDINGS:  Heart size normal. Lungs clear. No pneumothorax or pleural effusion.                                       Medications   0.9%  NaCl infusion (has no administration in time range)   0.9%  NaCl infusion (1,000 mLs Intravenous New Bag 12/31/22 1332)     Medical Decision Making:   ED Management:  Patient discussed with Dr. Hills, hospitalist on  call. Will admit for continued care and management.                  Clinical Impression:   Final diagnoses:  [R53.1] Generalized weakness  [N17.9] Acute kidney injury (Primary)  [D64.9] Anemia, unspecified type        ED Disposition Condition    Admit Stable                YONI Wall  12/31/22 2977

## 2022-12-31 NOTE — ED TRIAGE NOTES
"Presents to ED per [facility] for "just not feeling well".  Blood Sugar per EMS was reading 41, blood sugar treated per EMS with D50 and now blood sugar is 83.  "

## 2022-12-31 NOTE — SUBJECTIVE & OBJECTIVE
Interval History:  Patient seen and evaluated in the ER.  Please see my H&P.    Review of Systems  Objective:     Vital Signs (Most Recent):  Temp: 97.4 °F (36.3 °C) (12/31/22 1204)  Pulse: (!) 57 (12/31/22 1404)  Resp: 16 (12/31/22 1334)  BP: (!) 141/55 (12/31/22 1404)   Vital Signs (24h Range):  Temp:  [97.4 °F (36.3 °C)] 97.4 °F (36.3 °C)  Pulse:  [] 57  Resp:  [11-18] 16  BP: (133-144)/(47-55) 141/55     Weight: 72.6 kg (160 lb)  Body mass index is 21.7 kg/m².  No intake or output data in the 24 hours ending 12/31/22 1751   Physical Exam    Significant Labs: All pertinent labs within the past 24 hours have been reviewed.  BMP:   Recent Labs   Lab 12/31/22  1235   *      K 4.6   *   CO2 17*   BUN 86*   CREATININE 5.20*   CALCIUM 8.9     CBC:   Recent Labs   Lab 12/31/22  1231 12/31/22  1235   WBC  --  5.67   HGB  --  7.8*   HCT 23* 25.4*   PLT  --  354       Significant Imaging: I have reviewed all pertinent imaging results/findings within the past 24 hours.

## 2022-12-31 NOTE — PROGRESS NOTES
[facility] - Emergency Department  Hospital Medicine  Progress Note    Patient Name: Gama Cui  MRN: [id number]  Patient Class: IP- Inpatient   Admission Date: 12/31/2022  Length of Stay: 0 days  Attending Physician: Bruce Barger MD  Primary Care Provider: YONI Mcdaniels        Subjective:     Principal Problem:<principal problem not specified>        HPI:  No notes on file    Overview/Hospital Course:  No notes on file    Interval History:  Patient seen and evaluated in the ER.  Please see my H&P.    Review of Systems  Objective:     Vital Signs (Most Recent):  Temp: 97.4 °F (36.3 °C) (12/31/22 1204)  Pulse: (!) 57 (12/31/22 1404)  Resp: 16 (12/31/22 1334)  BP: (!) 141/55 (12/31/22 1404)   Vital Signs (24h Range):  Temp:  [97.4 °F (36.3 °C)] 97.4 °F (36.3 °C)  Pulse:  [] 57  Resp:  [11-18] 16  BP: (133-144)/(47-55) 141/55     Weight: 72.6 kg (160 lb)  Body mass index is 21.7 kg/m².  No intake or output data in the 24 hours ending 12/31/22 1751   Physical Exam    Significant Labs: All pertinent labs within the past 24 hours have been reviewed.  BMP:   Recent Labs   Lab 12/31/22  1235   *      K 4.6   *   CO2 17*   BUN 86*   CREATININE 5.20*   CALCIUM 8.9     CBC:   Recent Labs   Lab 12/31/22  1231 12/31/22  1235   WBC  --  5.67   HGB  --  7.8*   HCT 23* 25.4*   PLT  --  354       Significant Imaging: I have reviewed all pertinent imaging results/findings within the past 24 hours.      Assessment/Plan:      No notes have been filed under this hospital service.  Service: Hospital Medicine    VTE Risk Mitigation (From admission, onward)         Ordered     IP VTE HIGH RISK PATIENT  Once         12/31/22 1742     Place sequential compression device  Until discontinued         12/31/22 1742     Place JUAN ANTONIO hose  Until discontinued         12/31/22 1742                Discharge Planning   JOSE:      Code Status: Full Code   Is the patient medically ready for discharge?:      Reason for patient still in hospital (select all that apply): Patient new problem                     Bruce Barger MD  Department of Hospital Medicine   [facility] - Emergency Department

## 2023-01-01 VITALS
SYSTOLIC BLOOD PRESSURE: 83 MMHG | OXYGEN SATURATION: 60 % | HEIGHT: 72 IN | WEIGHT: 134.38 LBS | TEMPERATURE: 97 F | RESPIRATION RATE: 16 BRPM | DIASTOLIC BLOOD PRESSURE: 48 MMHG | BODY MASS INDEX: 18.2 KG/M2 | HEART RATE: 56 BPM

## 2023-01-01 PROBLEM — D62 ACUTE BLOOD LOSS ANEMIA: Status: ACTIVE | Noted: 2023-01-01

## 2023-01-01 LAB
ANISOCYTOSIS BLD QL SMEAR: ABNORMAL
APTT PPP: 38.2 SECONDS (ref 25.2–37.3)
BASOPHILS # BLD AUTO: 0.01 K/UL (ref 0–0.2)
BASOPHILS NFR BLD AUTO: 0.7 % (ref 0–1)
CRENATED CELLS: ABNORMAL
D DIMER PPP FEU-MCNC: >20 ΜG/ML (ref 0–0.47)
DIFFERENTIAL METHOD BLD: ABNORMAL
EOSINOPHIL # BLD AUTO: 0.01 K/UL (ref 0–0.5)
EOSINOPHIL NFR BLD AUTO: 0.7 % (ref 1–4)
ERYTHROCYTE [DISTWIDTH] IN BLOOD BY AUTOMATED COUNT: 17.9 % (ref 11.5–14.5)
FERRITIN SERPL-MCNC: 47 NG/ML (ref 26–388)
FOLATE SERPL-MCNC: >20 NG/ML (ref 3.1–17.5)
GLUCOSE SERPL-MCNC: 111 MG/DL (ref 70–105)
GLUCOSE SERPL-MCNC: 427 MG/DL (ref 70–105)
GLUCOSE SERPL-MCNC: 68 MG/DL (ref 70–105)
GLUCOSE SERPL-MCNC: 81 MG/DL (ref 70–105)
GLUCOSE SERPL-MCNC: 87 MG/DL (ref 70–105)
HAPTOGLOB SERPL NEPH-MCNC: 215 MG/DL (ref 30–200)
HCT VFR BLD AUTO: 32.3 % (ref 40–54)
HCT VFR BLD AUTO: 37.7 % (ref 40–54)
HGB BLD-MCNC: 12.1 G/DL (ref 13.5–18)
HGB BLD-MCNC: 9.7 G/DL (ref 13.5–18)
IMM GRANULOCYTES # BLD AUTO: 0.02 K/UL (ref 0–0.04)
IMM GRANULOCYTES NFR BLD: 1.4 % (ref 0–0.4)
INR BLD: 1.5
IRON SATN MFR SERPL: 10 % (ref 14–50)
IRON SERPL-MCNC: 41 ΜG/DL (ref 65–175)
LDH SERPL-CCNC: 279 U/L (ref 87–241)
LYMPHOCYTES # BLD AUTO: 0.65 K/UL (ref 1–4.8)
LYMPHOCYTES NFR BLD AUTO: 45.8 % (ref 27–41)
LYMPHOCYTES NFR BLD MANUAL: 41 % (ref 27–41)
MCH RBC QN AUTO: 24 PG (ref 27–31)
MCHC RBC AUTO-ENTMCNC: 30 G/DL (ref 32–36)
MCV RBC AUTO: 79.8 FL (ref 80–96)
METAMYELOCYTES NFR BLD MANUAL: 7 %
MONOCYTES # BLD AUTO: 0.05 K/UL (ref 0–0.8)
MONOCYTES NFR BLD AUTO: 3.5 % (ref 2–6)
MONOCYTES NFR BLD MANUAL: 4 % (ref 2–6)
MPC BLD CALC-MCNC: 10.2 FL (ref 9.4–12.4)
MYELOCYTES NFR BLD MANUAL: 3 %
NEUTROPHILS # BLD AUTO: 0.68 K/UL (ref 1.8–7.7)
NEUTROPHILS NFR BLD AUTO: 47.9 % (ref 53–65)
NEUTS BAND NFR BLD MANUAL: 10 % (ref 1–5)
NEUTS SEG NFR BLD MANUAL: 35 % (ref 50–62)
NRBC # BLD AUTO: 0.53 X10E3/UL
NRBC BLD MANUAL-RTO: 35 /100 WBC
NRBC, AUTO (.00): 37.3 %
OVALOCYTES BLD QL SMEAR: ABNORMAL
PLATELET # BLD AUTO: 216 K/UL (ref 150–400)
PLATELET MORPHOLOGY: ABNORMAL
POLYCHROMASIA BLD QL SMEAR: ABNORMAL
PROTHROMBIN TIME: 17.5 SECONDS (ref 11.7–14.7)
RBC # BLD AUTO: 4.05 M/UL (ref 4.6–6.2)
SCHISTOCYTES BLD QL AUTO: ABNORMAL
TIBC SERPL-MCNC: 398 ΜG/DL (ref 250–450)
TROPONIN I SERPL HS-MCNC: 45.8 PG/ML
TSH SERPL DL<=0.005 MIU/L-ACNC: 0.41 UIU/ML (ref 0.36–3.74)
VIT B12 SERPL-MCNC: 1014 PG/ML (ref 193–986)
WBC # BLD AUTO: 1.42 K/UL (ref 4.5–11)

## 2023-01-01 PROCEDURE — 94002 VENT MGMT INPAT INIT DAY: CPT

## 2023-01-01 PROCEDURE — 83540 ASSAY OF IRON: CPT | Performed by: INTERNAL MEDICINE

## 2023-01-01 PROCEDURE — 31500 INSERT EMERGENCY AIRWAY: CPT | Mod: 59,,, | Performed by: HOSPITALIST

## 2023-01-01 PROCEDURE — 84484 ASSAY OF TROPONIN QUANT: CPT

## 2023-01-01 PROCEDURE — 25000003 PHARM REV CODE 250: Performed by: INTERNAL MEDICINE

## 2023-01-01 PROCEDURE — 99238 PR HOSPITAL DISCHARGE DAY,<30 MIN: ICD-10-PCS | Mod: 25,,, | Performed by: HOSPITALIST

## 2023-01-01 PROCEDURE — 82746 ASSAY OF FOLIC ACID SERUM: CPT | Performed by: INTERNAL MEDICINE

## 2023-01-01 PROCEDURE — 92950 HEART/LUNG RESUSCITATION CPR: CPT

## 2023-01-01 PROCEDURE — 31500 PR INSERT, EMERGENCY ENDOTRACH AIRWAY: ICD-10-PCS | Mod: ,,, | Performed by: HOSPITALIST

## 2023-01-01 PROCEDURE — 25000003 PHARM REV CODE 250

## 2023-01-01 PROCEDURE — 83550 IRON BINDING TEST: CPT | Performed by: INTERNAL MEDICINE

## 2023-01-01 PROCEDURE — 93005 ELECTROCARDIOGRAM TRACING: CPT

## 2023-01-01 PROCEDURE — 85610 PROTHROMBIN TIME: CPT

## 2023-01-01 PROCEDURE — 82962 GLUCOSE BLOOD TEST: CPT

## 2023-01-01 PROCEDURE — 99222 1ST HOSP IP/OBS MODERATE 55: CPT | Mod: ,,, | Performed by: INTERNAL MEDICINE

## 2023-01-01 PROCEDURE — 63600175 PHARM REV CODE 636 W HCPCS

## 2023-01-01 PROCEDURE — 83010 ASSAY OF HAPTOGLOBIN QUANT: CPT | Performed by: INTERNAL MEDICINE

## 2023-01-01 PROCEDURE — 85018 HEMOGLOBIN: CPT | Performed by: INTERNAL MEDICINE

## 2023-01-01 PROCEDURE — 31500 INSERT EMERGENCY AIRWAY: CPT

## 2023-01-01 PROCEDURE — 84443 ASSAY THYROID STIM HORMONE: CPT | Performed by: INTERNAL MEDICINE

## 2023-01-01 PROCEDURE — 63600175 PHARM REV CODE 636 W HCPCS: Performed by: HOSPITALIST

## 2023-01-01 PROCEDURE — 36415 COLL VENOUS BLD VENIPUNCTURE: CPT | Performed by: INTERNAL MEDICINE

## 2023-01-01 PROCEDURE — 93010 EKG 12-LEAD: ICD-10-PCS | Mod: ,,, | Performed by: HOSPITALIST

## 2023-01-01 PROCEDURE — 99238 HOSP IP/OBS DSCHRG MGMT 30/<: CPT | Mod: 25,,, | Performed by: HOSPITALIST

## 2023-01-01 PROCEDURE — 63600175 PHARM REV CODE 636 W HCPCS: Performed by: INTERNAL MEDICINE

## 2023-01-01 PROCEDURE — 85730 THROMBOPLASTIN TIME PARTIAL: CPT

## 2023-01-01 PROCEDURE — C9113 INJ PANTOPRAZOLE SODIUM, VIA: HCPCS | Performed by: INTERNAL MEDICINE

## 2023-01-01 PROCEDURE — 82728 ASSAY OF FERRITIN: CPT | Performed by: INTERNAL MEDICINE

## 2023-01-01 PROCEDURE — 25000003 PHARM REV CODE 250: Performed by: HOSPITALIST

## 2023-01-01 PROCEDURE — 31500 INTUBATION: ICD-10-PCS | Mod: ,,, | Performed by: HOSPITALIST

## 2023-01-01 PROCEDURE — 85025 COMPLETE CBC W/AUTO DIFF WBC: CPT

## 2023-01-01 PROCEDURE — 99900035 HC TECH TIME PER 15 MIN (STAT)

## 2023-01-01 PROCEDURE — 11000001 HC ACUTE MED/SURG PRIVATE ROOM

## 2023-01-01 PROCEDURE — 83615 LACTATE (LD) (LDH) ENZYME: CPT | Performed by: INTERNAL MEDICINE

## 2023-01-01 PROCEDURE — 93010 ELECTROCARDIOGRAM REPORT: CPT | Mod: ,,, | Performed by: HOSPITALIST

## 2023-01-01 PROCEDURE — 92950 PR HEART/LUNG RESUSCITATION (CPR): ICD-10-PCS | Mod: ,,, | Performed by: HOSPITALIST

## 2023-01-01 PROCEDURE — 36415 COLL VENOUS BLD VENIPUNCTURE: CPT

## 2023-01-01 PROCEDURE — 85379 FIBRIN DEGRADATION QUANT: CPT

## 2023-01-01 PROCEDURE — 36600 WITHDRAWAL OF ARTERIAL BLOOD: CPT

## 2023-01-01 PROCEDURE — 27100108

## 2023-01-01 PROCEDURE — 92950 HEART/LUNG RESUSCITATION CPR: CPT | Mod: ,,, | Performed by: HOSPITALIST

## 2023-01-01 PROCEDURE — 94761 N-INVAS EAR/PLS OXIMETRY MLT: CPT

## 2023-01-01 PROCEDURE — 99222 PR INITIAL HOSPITAL CARE,LEVL II: ICD-10-PCS | Mod: ,,, | Performed by: INTERNAL MEDICINE

## 2023-01-01 RX ORDER — PROPOFOL 10 MG/ML
0-50 INJECTION, EMULSION INTRAVENOUS CONTINUOUS
Status: DISCONTINUED | OUTPATIENT
Start: 2023-01-01 | End: 2023-01-01 | Stop reason: HOSPADM

## 2023-01-01 RX ORDER — PROPRANOLOL HYDROCHLORIDE 40 MG/1
40 TABLET ORAL 2 TIMES DAILY
Status: DISCONTINUED | OUTPATIENT
Start: 2023-01-01 | End: 2023-01-01

## 2023-01-01 RX ORDER — ETOMIDATE 2 MG/ML
INJECTION INTRAVENOUS
Status: DISCONTINUED
Start: 2023-01-01 | End: 2023-01-01 | Stop reason: WASHOUT

## 2023-01-01 RX ORDER — CALCIUM CHLORIDE INJECTION 100 MG/ML
INJECTION, SOLUTION INTRAVENOUS CODE/TRAUMA/SEDATION MEDICATION
Status: COMPLETED | OUTPATIENT
Start: 2023-01-01 | End: 2023-01-01

## 2023-01-01 RX ORDER — MUPIROCIN 20 MG/G
OINTMENT TOPICAL 2 TIMES DAILY
Status: DISCONTINUED | OUTPATIENT
Start: 2023-01-01 | End: 2023-01-01 | Stop reason: HOSPADM

## 2023-01-01 RX ORDER — ATROPINE SULFATE 0.1 MG/ML
INJECTION INTRAVENOUS CODE/TRAUMA/SEDATION MEDICATION
Status: COMPLETED | OUTPATIENT
Start: 2023-01-01 | End: 2023-01-01

## 2023-01-01 RX ORDER — EPINEPHRINE 0.1 MG/ML
INJECTION INTRAVENOUS CODE/TRAUMA/SEDATION MEDICATION
Status: COMPLETED | OUTPATIENT
Start: 2023-01-01 | End: 2023-01-01

## 2023-01-01 RX ORDER — SODIUM BICARBONATE 1 MEQ/ML
SYRINGE (ML) INTRAVENOUS CODE/TRAUMA/SEDATION MEDICATION
Status: COMPLETED | OUTPATIENT
Start: 2023-01-01 | End: 2023-01-01

## 2023-01-01 RX ORDER — ROCURONIUM BROMIDE 10 MG/ML
INJECTION, SOLUTION INTRAVENOUS
Status: DISCONTINUED
Start: 2023-01-01 | End: 2023-01-01 | Stop reason: WASHOUT

## 2023-01-01 RX ORDER — ATORVASTATIN CALCIUM 40 MG/1
40 TABLET, FILM COATED ORAL DAILY
Status: DISCONTINUED | OUTPATIENT
Start: 2023-01-01 | End: 2023-01-01 | Stop reason: HOSPADM

## 2023-01-01 RX ORDER — VERAPAMIL HYDROCHLORIDE 240 MG/1
240 TABLET, FILM COATED, EXTENDED RELEASE ORAL DAILY
Status: DISCONTINUED | OUTPATIENT
Start: 2023-01-01 | End: 2023-01-01

## 2023-01-01 RX ORDER — CILOSTAZOL 50 MG/1
50 TABLET ORAL 2 TIMES DAILY
Status: DISCONTINUED | OUTPATIENT
Start: 2023-01-01 | End: 2023-01-01

## 2023-01-01 RX ORDER — HEPARIN SODIUM,PORCINE/D5W 25000/250
0-40 INTRAVENOUS SOLUTION INTRAVENOUS CONTINUOUS
Status: DISCONTINUED | OUTPATIENT
Start: 2023-01-01 | End: 2023-01-01 | Stop reason: HOSPADM

## 2023-01-01 RX ORDER — LISINOPRIL 20 MG/1
20 TABLET ORAL DAILY
Status: DISCONTINUED | OUTPATIENT
Start: 2023-01-01 | End: 2023-01-01

## 2023-01-01 RX ORDER — HYDRALAZINE HYDROCHLORIDE 20 MG/ML
15 INJECTION INTRAMUSCULAR; INTRAVENOUS EVERY 8 HOURS PRN
Status: DISCONTINUED | OUTPATIENT
Start: 2023-01-01 | End: 2023-01-01 | Stop reason: HOSPADM

## 2023-01-01 RX ADMIN — ATORVASTATIN CALCIUM 40 MG: 40 TABLET, FILM COATED ORAL at 02:01

## 2023-01-01 RX ADMIN — EPINEPHRINE 0.1 MG: 0.1 INJECTION INTRACARDIAC; INTRAVENOUS at 01:01

## 2023-01-01 RX ADMIN — SODIUM BICARBONATE 50 MEQ: 84 INJECTION, SOLUTION INTRAVENOUS at 01:01

## 2023-01-01 RX ADMIN — ACETAMINOPHEN 1000 MG: 500 TABLET ORAL at 08:01

## 2023-01-01 RX ADMIN — VERAPAMIL HYDROCHLORIDE 240 MG: 240 TABLET, FILM COATED, EXTENDED RELEASE ORAL at 02:01

## 2023-01-01 RX ADMIN — LISINOPRIL 20 MG: 20 TABLET ORAL at 02:01

## 2023-01-01 RX ADMIN — DEXTROSE MONOHYDRATE 25 G: 25 INJECTION, SOLUTION INTRAVENOUS at 01:01

## 2023-01-01 RX ADMIN — HEPARIN SODIUM 18 UNITS/KG/HR: 10000 INJECTION, SOLUTION INTRAVENOUS at 02:01

## 2023-01-01 RX ADMIN — DEXTROSE MONOHYDRATE 125 ML: 100 INJECTION, SOLUTION INTRAVENOUS at 01:01

## 2023-01-01 RX ADMIN — PANTOPRAZOLE SODIUM 40 MG: 40 INJECTION, POWDER, LYOPHILIZED, FOR SOLUTION INTRAVENOUS at 09:01

## 2023-01-01 RX ADMIN — POLYETHYLENE GLYCOL 3350 17 G: 17 POWDER, FOR SOLUTION ORAL at 09:01

## 2023-01-01 RX ADMIN — ATROPINE SULFATE 1 MG: 0.1 INJECTION PARENTERAL at 01:01

## 2023-01-01 RX ADMIN — CILOSTAZOL 50 MG: 50 TABLET ORAL at 08:01

## 2023-01-01 RX ADMIN — CALCIUM CHLORIDE 1 G: 100 INJECTION INTRAVENOUS; INTRAVENTRICULAR at 01:01

## 2023-01-01 NOTE — HPI
77-year-old man seen emergency room department on 12/31/2023.  He complains of 3-4 weeks history of profound weakness and fatigue.  Complains of dyspnea on exertion.  He denies melena hematochezia hemoptysis and and hematuria.  He stated he has had no active bleeding.  He denies chest pain.  There is no nausea vomiting diarrhea constipation.  Patient presents with symptomatic anemia.

## 2023-01-01 NOTE — ASSESSMENT & PLAN NOTE
Patient presented with profound anemia.    Will start a complete anemia workup.  Will check iron ferritin TIBC  B12 folate   Haptoglobin, LDH.  Transfuse 2 units of packed red blood cells   Follow his H&H closely.

## 2023-01-01 NOTE — ED NOTES
Dr Barger in room to talk with patient and discuss blood transfusion.  Patient Blood Transfusion consent signed after talking with Dr Barger.

## 2023-01-01 NOTE — SUBJECTIVE & OBJECTIVE
Past Medical History:   Diagnosis Date    Coronary artery disease     Hyperlipidemia     Hypertension     MI (myocardial infarction)     PVD (peripheral vascular disease)     Renal insufficiency        Past Surgical History:   Procedure Laterality Date    AMPUTATION OF REPLICATED TOES      VASCULAR SURGERY      leg bypass       Review of patient's allergies indicates:   Allergen Reactions    Naproxen sodium        No current facility-administered medications on file prior to encounter.     Current Outpatient Medications on File Prior to Encounter   Medication Sig    cilostazoL (PLETAL) 50 MG Tab Take 1 tablet (50 mg total) by mouth 2 (two) times daily.    clopidogreL (PLAVIX) 75 mg tablet Take 1 tablet (75 mg total) by mouth once daily.    hydroCHLOROthiazide (HYDRODIURIL) 25 MG tablet Take 1 tablet (25 mg total) by mouth once daily.    lisinopriL (PRINIVIL,ZESTRIL) 20 MG tablet Take 1 tablet (20 mg total) by mouth once daily.    propranoloL (INDERAL) 40 MG tablet Take 1 tablet (40 mg total) by mouth 2 (two) times daily.    rosuvastatin (CRESTOR) 5 MG tablet Take 1 tablet (5 mg total) by mouth once daily.    verapamiL (CALAN-SR) 240 MG CR tablet Take 1 tablet (240 mg total) by mouth once daily.     Family History       Problem Relation (Age of Onset)    Diabetes Sister, Brother    Heart disease Father    Hypertension Mother          Tobacco Use    Smoking status: Former     Packs/day: 0.50     Years: 35.00     Pack years: 17.50     Types: Cigarettes    Smokeless tobacco: Never    Tobacco comments:     8-10 per day   Substance and Sexual Activity    Alcohol use: Yes     Comment: weekends    Drug use: Never    Sexual activity: Not Currently     Review of Systems   Constitutional:  Negative for fatigue and fever.   Cardiovascular:  Negative for chest pain.   Skin:  Positive for pallor.   Neurological:  Positive for dizziness and weakness.   Psychiatric/Behavioral:  Negative for confusion.    Objective:     Vital Signs  (Most Recent):  Temp: 98.5 °F (36.9 °C) (01/01/23 1345)  Pulse: 76 (01/01/23 1345)  Resp: 20 (01/01/23 1345)  BP: (!) 148/73 (01/01/23 1406)  SpO2: 99 % (01/01/23 1345)   Vital Signs (24h Range):  Temp:  [98.5 °F (36.9 °C)-99.2 °F (37.3 °C)] 98.5 °F (36.9 °C)  Pulse:  [] 76  Resp:  [12-27] 20  SpO2:  [40 %-100 %] 99 %  BP: (121-223)/(45-95) 148/73     Weight: 61 kg (134 lb 6.4 oz)  Body mass index is 18.23 kg/m².    Physical Exam  Vitals and nursing note reviewed.   Constitutional:       Appearance: He is ill-appearing.   HENT:      Head: Atraumatic.      Nose: Nose normal.      Mouth/Throat:      Mouth: Mucous membranes are moist.      Pharynx: Oropharynx is clear.   Eyes:      Extraocular Movements: Extraocular movements intact.      Pupils: Pupils are equal, round, and reactive to light.   Cardiovascular:      Rate and Rhythm: Normal rate and regular rhythm.      Pulses: Normal pulses.      Heart sounds: Normal heart sounds.   Pulmonary:      Effort: Pulmonary effort is normal.      Breath sounds: Normal breath sounds.   Abdominal:      General: Abdomen is flat. Bowel sounds are normal.      Palpations: Abdomen is soft.   Musculoskeletal:         General: Normal range of motion.      Cervical back: Normal range of motion and neck supple.      Right lower leg: No edema.      Left lower leg: No edema.   Skin:     General: Skin is warm and dry.      Coloration: Skin is not jaundiced or pale.      Findings: No rash.   Neurological:      General: No focal deficit present.      Mental Status: He is alert and oriented to person, place, and time. Mental status is at baseline.      Motor: Weakness present.   Psychiatric:         Mood and Affect: Mood normal.         Thought Content: Thought content normal.         CRANIAL NERVES     CN III, IV, VI   Pupils are equal, round, and reactive to light.     Significant Labs: All pertinent labs within the past 24 hours have been reviewed.  BMP:   Recent Labs   Lab  12/31/22  1235   *      K 4.6   *   CO2 17*   BUN 86*   CREATININE 5.20*   CALCIUM 8.9     CBC:   Recent Labs   Lab 12/31/22  1231 12/31/22  1235   WBC  --  5.67   HGB  --  7.8*   HCT 23* 25.4*   PLT  --  354     CMP:   Recent Labs   Lab 12/31/22  1235      K 4.6   *   CO2 17*   *   BUN 86*   CREATININE 5.20*   CALCIUM 8.9   PROT 7.8   ALBUMIN 3.6   BILITOT 0.3   ALKPHOS 60   AST 20   ALT 15*   ANIONGAP 19*       Significant Imaging: I have reviewed all pertinent imaging results/findings within the past 24 hours.

## 2023-01-01 NOTE — H&P
[facility]  [facility] Medicine  History & Physical    Patient Name: Gama Cui  MRN: [id number]  Patient Class: IP- Inpatient  Admission Date: 12/31/2022  Attending Physician: Bruce Barger MD   Primary Care Provider: YONI Mcdaniels         Patient information was obtained from patient and ER records.     Subjective:     Principal Problem:Acute blood loss anemia    Chief Complaint:   Chief Complaint   Patient presents with    Hypoglycemia        HPI: 77-year-old man seen emergency room department on 12/31/2023.  He complains of 3-4 weeks history of profound weakness and fatigue.  Complains of dyspnea on exertion.  He denies melena hematochezia hemoptysis and and hematuria.  He stated he has had no active bleeding.  He denies chest pain.  There is no nausea vomiting diarrhea constipation.  Patient presents with symptomatic anemia.      Past Medical History:   Diagnosis Date    Coronary artery disease     Hyperlipidemia     Hypertension     MI (myocardial infarction)     PVD (peripheral vascular disease)     Renal insufficiency        Past Surgical History:   Procedure Laterality Date    AMPUTATION OF REPLICATED TOES      VASCULAR SURGERY      leg bypass       Review of patient's allergies indicates:   Allergen Reactions    Naproxen sodium        No current facility-administered medications on file prior to encounter.     Current Outpatient Medications on File Prior to Encounter   Medication Sig    cilostazoL (PLETAL) 50 MG Tab Take 1 tablet (50 mg total) by mouth 2 (two) times daily.    clopidogreL (PLAVIX) 75 mg tablet Take 1 tablet (75 mg total) by mouth once daily.    hydroCHLOROthiazide (HYDRODIURIL) 25 MG tablet Take 1 tablet (25 mg total) by mouth once daily.    lisinopriL (PRINIVIL,ZESTRIL) 20 MG tablet Take 1 tablet (20 mg total) by mouth once daily.    propranoloL (INDERAL) 40 MG tablet Take 1 tablet (40 mg total) by mouth 2 (two) times daily.     rosuvastatin (CRESTOR) 5 MG tablet Take 1 tablet (5 mg total) by mouth once daily.    verapamiL (CALAN-SR) 240 MG CR tablet Take 1 tablet (240 mg total) by mouth once daily.     Family History       Problem Relation (Age of Onset)    Diabetes Sister, Brother    Heart disease Father    Hypertension Mother          Tobacco Use    Smoking status: Former     Packs/day: 0.50     Years: 35.00     Pack years: 17.50     Types: Cigarettes    Smokeless tobacco: Never    Tobacco comments:     8-10 per day   Substance and Sexual Activity    Alcohol use: Yes     Comment: weekends    Drug use: Never    Sexual activity: Not Currently     Review of Systems   Constitutional:  Negative for fatigue and fever.   Cardiovascular:  Negative for chest pain.   Skin:  Positive for pallor.   Neurological:  Positive for dizziness and weakness.   Psychiatric/Behavioral:  Negative for confusion.    Objective:     Vital Signs (Most Recent):  Temp: 98.5 °F (36.9 °C) (01/01/23 1345)  Pulse: 76 (01/01/23 1345)  Resp: 20 (01/01/23 1345)  BP: (!) 148/73 (01/01/23 1406)  SpO2: 99 % (01/01/23 1345)   Vital Signs (24h Range):  Temp:  [98.5 °F (36.9 °C)-99.2 °F (37.3 °C)] 98.5 °F (36.9 °C)  Pulse:  [] 76  Resp:  [12-27] 20  SpO2:  [40 %-100 %] 99 %  BP: (121-223)/(45-95) 148/73     Weight: 61 kg (134 lb 6.4 oz)  Body mass index is 18.23 kg/m².    Physical Exam  Vitals and nursing note reviewed.   Constitutional:       Appearance: He is ill-appearing.   HENT:      Head: Atraumatic.      Nose: Nose normal.      Mouth/Throat:      Mouth: Mucous membranes are moist.      Pharynx: Oropharynx is clear.   Eyes:      Extraocular Movements: Extraocular movements intact.      Pupils: Pupils are equal, round, and reactive to light.   Cardiovascular:      Rate and Rhythm: Normal rate and regular rhythm.      Pulses: Normal pulses.      Heart sounds: Normal heart sounds.   Pulmonary:      Effort: Pulmonary effort is normal.      Breath sounds: Normal  breath sounds.   Abdominal:      General: Abdomen is flat. Bowel sounds are normal.      Palpations: Abdomen is soft.   Musculoskeletal:         General: Normal range of motion.      Cervical back: Normal range of motion and neck supple.      Right lower leg: No edema.      Left lower leg: No edema.   Skin:     General: Skin is warm and dry.      Coloration: Skin is not jaundiced or pale.      Findings: No rash.   Neurological:      General: No focal deficit present.      Mental Status: He is alert and oriented to person, place, and time. Mental status is at baseline.      Motor: Weakness present.   Psychiatric:         Mood and Affect: Mood normal.         Thought Content: Thought content normal.         CRANIAL NERVES     CN III, IV, VI   Pupils are equal, round, and reactive to light.     Significant Labs: All pertinent labs within the past 24 hours have been reviewed.  BMP:   Recent Labs   Lab 12/31/22  1235   *      K 4.6   *   CO2 17*   BUN 86*   CREATININE 5.20*   CALCIUM 8.9     CBC:   Recent Labs   Lab 12/31/22  1231 12/31/22  1235   WBC  --  5.67   HGB  --  7.8*   HCT 23* 25.4*   PLT  --  354     CMP:   Recent Labs   Lab 12/31/22  1235      K 4.6   *   CO2 17*   *   BUN 86*   CREATININE 5.20*   CALCIUM 8.9   PROT 7.8   ALBUMIN 3.6   BILITOT 0.3   ALKPHOS 60   AST 20   ALT 15*   ANIONGAP 19*       Significant Imaging: I have reviewed all pertinent imaging results/findings within the past 24 hours.    Assessment/Plan:     * Acute blood loss anemia  Patient presented with profound anemia.    Will start a complete anemia workup.  Will check iron ferritin TIBC  B12 folate   Haptoglobin, LDH.  Transfuse 2 units of packed red blood cells   Follow his H&H closely.      Stage 3 chronic kidney disease    Follow creatinine    Hyperlipidemia  Resume statin      Essential hypertension    P.r.n. hydralazine, resume home medications      VTE Risk Mitigation (From admission, onward)          Ordered     IP VTE HIGH RISK PATIENT  Once         12/31/22 1742     Place sequential compression device  Until discontinued         12/31/22 1742     Place JUAN ANTONIO hose  Until discontinued         12/31/22 1742                   Bruce Barger MD  Department of Hospital Medicine   [facility]

## 2023-01-02 NOTE — SUBJECTIVE & OBJECTIVE
Interval History:  Patient feeling better today.  Patient is in no acute distress.  He is status post 2 units of packed red blood cells due to anemia.  Will repeat his hemoglobin hematocrit today.  Consult GI for EGD.    Review of Systems   Constitutional:  Negative for fatigue and fever.   Cardiovascular:  Negative for chest pain.   Psychiatric/Behavioral:  Negative for confusion.    Objective:     Vital Signs (Most Recent):  Temp: 98.2 °F (36.8 °C) (01/01/23 1630)  Pulse: 74 (01/01/23 1630)  Resp: 16 (01/01/23 1630)  BP: 137/64 (01/01/23 1630)  SpO2: (!) 93 % (01/01/23 1630)   Vital Signs (24h Range):  Temp:  [98.2 °F (36.8 °C)-99.2 °F (37.3 °C)] 98.2 °F (36.8 °C)  Pulse:  [] 74  Resp:  [12-27] 16  SpO2:  [87 %-100 %] 93 %  BP: (125-223)/(45-95) 137/64     Weight: 61 kg (134 lb 6.4 oz)  Body mass index is 18.23 kg/m².    Intake/Output Summary (Last 24 hours) at 1/1/2023 1814  Last data filed at 12/31/2022 1842  Gross per 24 hour   Intake 337 ml   Output --   Net 337 ml      Physical Exam  Vitals and nursing note reviewed.   Constitutional:       Appearance: Normal appearance.   Cardiovascular:      Rate and Rhythm: Normal rate and regular rhythm.      Pulses: Normal pulses.      Heart sounds: Normal heart sounds.   Pulmonary:      Effort: Pulmonary effort is normal.      Breath sounds: Normal breath sounds.   Abdominal:      General: Abdomen is flat. Bowel sounds are normal.      Palpations: Abdomen is soft.   Musculoskeletal:         General: Normal range of motion.   Skin:     General: Skin is warm and dry.   Neurological:      General: No focal deficit present.      Mental Status: He is alert and oriented to person, place, and time. Mental status is at baseline.       Significant Labs: All pertinent labs within the past 24 hours have been reviewed.  BMP:   Recent Labs   Lab 12/31/22  1235   *      K 4.6   *   CO2 17*   BUN 86*   CREATININE 5.20*   CALCIUM 8.9     CBC:   Recent Labs    Lab 12/31/22  1231 12/31/22  1235 01/01/23  1514   WBC  --  5.67  --    HGB  --  7.8* 12.1*   HCT 23* 25.4* 37.7*   PLT  --  354  --        Significant Imaging: I have reviewed all pertinent imaging results/findings within the past 24 hours.

## 2023-01-02 NOTE — DISCHARGE SUMMARY
[facility] Hospital Medicine  Discharge Summary      Patient Name: Gama Cui  MRN: [id number]  FRED: [id number]  Patient Class: IP- Inpatient  Admission Date: 12/31/2022  Hospital Length of Stay: 2 days  Discharge Date and Time:  01/02/2023 4:55 AM  Attending Physician: Myles Longoria MD   Discharging Provider: Noemy Randall MD  Primary Care Provider: YONI Mcdaniels    Primary Care Team: Networked reference to record PCT     HPI:   77-year-old man seen emergency room department on 12/31/2023.  He complains of 3-4 weeks history of profound weakness and fatigue.  Complains of dyspnea on exertion.  He denies melena hematochezia hemoptysis and and hematuria.  He stated he has had no active bleeding.  He denies chest pain.  There is no nausea vomiting diarrhea constipation.  Patient presents with symptomatic anemia.      * No surgery found *      Hospital Course:   66 yo M admitted on 13/31 with several weeks of generalized weakness and decreased PO intake.  When he arrived his BS was 41 and he had return to baseline mental status with amp of dextrose.  His decreased LOC resolved but was found to be anemic and was admitted for blood transfusion and preparation for endoscopic evaluation.  He had h/o CAD and CKD and multiple complications from atherosclerotic disease.      Patient got up to go to the bathroom and when returning to bed became unresponsive and agonal respirations.  He also became quite hypoxic.  RRT called.  Patient was intubated and then went into cardiac arrest.  Coded from 0115 to 0130 and had ROSC asnd transferred to the ICU.  Patient had a new RBBB and heparin infusion was being started and patient was evaluated for hypothermia protocol  and was responding to commands and moving all extremities.  At 0211 patient became unresponsive and again went into asystole.  CPR started and epi given.  Patient had multiple rounds of epi as well as TNK (tenecteplase) given with  fifteen minutes of uninterrupted CPR.  Patient did not have ROSC and code called at 0247 and brother notified.      Patient had no heart or breath sounds auscultated.  Pupils were fixed and dilated.         Goals of Care Treatment Preferences:  Code Status: Full Code      Consults:   Consults (From admission, onward)          Status Ordering Provider     Inpatient consult to Gastroenterology  Once        Provider:  Shekhar Hills MD    Ordered KIMBERLYN HOOKS            No new Assessment & Plan notes have been filed under this hospital service since the last note was generated.  Service: Hospital Medicine    Final Active Diagnoses:    Diagnosis Date Noted POA    PRINCIPAL PROBLEM:  Acute blood loss anemia [D62] 2023 Yes    Stage 3 chronic kidney disease [N18.30] 2022 Yes     Chronic    Essential hypertension [I10]  Yes    Hyperlipidemia [E78.5]  Yes      Problems Resolved During this Admission:       Discharged Condition:     Disposition:     Follow Up:    Patient Instructions:   No discharge procedures on file.    Significant Diagnostic Studies: Labs: All labs within the past 24 hours have been reviewed    Pending Diagnostic Studies:       Procedure Component Value Units Date/Time    EKG 12-lead [[id number]]     Order Status: Sent Lab Status: No result     EKG 12-lead [[id number]] Collected: 22 1205    Order Status: Sent Lab Status: In process Updated: 23 0515    Narrative:      Test Reason : R53.1,    Vent. Rate : 068 BPM     Atrial Rate : 000 BPM     P-R Int : 136 ms          QRS Dur : 130 ms      QT Int : 426 ms       P-R-T Axes : 076 061 095 degrees     QTc Int : 440 ms    Sinus rhythm  with frequent PVCs  Inferior/lateral T wave abnormality  is nonspecific  Abnormal ECG      Referred By: [email or web address]   SELF           Confirmed By:     EXTRA TUBES [[id number]] Collected: 23 0156    Order Status: Sent Lab Status: In process Updated: 23    Specimen: Blood, Venous      Narrative:      The following orders were created for panel order EXTRA TUBES.  Procedure                               Abnormality         Status                     ---------                               -----------         ------                     Red Top Hold[[id number]]                                     In process                   Please view results for these tests on the individual orders.    Echo [[id number]]     Order Status: Sent Lab Status: No result     US Lower Extremity Veins Bilateral [[id number]] Resulted: 23 0033    Order Status: Sent Lab Status: In process Updated: 23    X-Ray Chest AP Portable [[id number]] Resulted: 23    Order Status: Sent Lab Status: In process Updated: 23           Medications:  None    Indwelling Lines/Drains at time of discharge:   Lines/Drains/Airways       Airway  Duration                  Airway - Non-Surgical 23 Endotracheal Tube <1 day                Patient .  Diagnosis at discharge were acute respiratory failure with hypoxia and clinically compatible with the diagnosis of acute PTE.  Patient with CKD 4 and I cannot make a judgement on the encephalopathy.  I saw him in RRT then code state and he was following commands at that time and answering questions appropriately.        Time spent on the discharge of patient: less than 30 minutes         Noemy Randall MD  Department of Hospital Medicine  [facility]

## 2023-01-02 NOTE — HOSPITAL COURSE
64 yo M admitted on 13/31 with several weeks of generalized weakness and decreased PO intake.  When he arrived his BS was 41 and he had return to baseline mental status with amp of dextrose.  His decreased LOC resolved but was found to be anemic and was admitted for blood transfusion and preparation for endoscopic evaluation.  He had h/o CAD and CKD and multiple complications from atherosclerotic disease.      Patient got up to go to the bathroom and when returning to bed became unresponsive and agonal respirations.  He also became quite hypoxic.  RRT called.  Patient was intubated and then went into cardiac arrest.  Coded from 0115 to 0130 and had ROSC asnd transferred to the ICU.  Patient had a new RBBB and heparin infusion was being started and patient was evaluated for hypothermia protocol  and was responding to commands and moving all extremities.  At 0211 patient became unresponsive and again went into asystole.  CPR started and epi given.  Patient had multiple rounds of epi as well as TNK (tenecteplase) given with fifteen minutes of uninterrupted CPR.  Patient did not have ROSC and code called at 0247 and brother notified.      Patient had no heart or breath sounds auscultated.  Pupils were fixed and dilated.

## 2023-01-02 NOTE — NURSING
"At approx. 2353 Dr. Randall was notified of a change in patient status per message sent.     ''Patient has had a change in mental status. He is alert and oriented but he's trembling, drooling at the mouth and isn't quick to respond. He's very anxious and complained of shoulder/back pain that radiates across his whole shoulder area. He denies any chest pain. He also had trouble walking to the bathroom and it took two of us to get him off the toilet. He has been a able to go by himself or with one assist. Last /62 and checked his sugar it was 81. He's not a diabetic though. Thanks"      Residents came to the floor to assess patietnt. No new orders.  "

## 2023-01-02 NOTE — PROCEDURES
Intubation    Date/Time: 1/2/2023 1:42 AM  Location procedure was performed: [facility] MEDICINE  Performed by: Donny Loco MD  Authorized by: Noemy Randall MD   Consent Done: Emergent Situation  Indications: respiratory failure  Intubation method: fiberoptic oral  Patient status: sedated  Preoxygenation: mask  Pretreatment medications: vecuronium  Sedatives: etomidate  Paralytic: none  Tube size: 7.5 mm  Tube type: cuffed  Number of attempts: 2  Post-procedure assessment: chest rise  Breath sounds: clear and equal  Cuff inflated: yes  Complications: No  Specimens: No  Implants: No

## 2023-01-02 NOTE — NURSING
RRT upgraded to Code Blue.   Patient went unresponsive without a pulse. Intubated by resident with Dr. Randall present. Multiple rounds of CPR and epi given. Patient was coded from 0115 to 0130 until ROSC was achieved. Patient was then transferred to ICU. Report given to Luis in ICU upon arrival.

## 2023-01-02 NOTE — NURSING
1/2/2023 - 01:35 Patient arrival in ICU-S Room 10.      CODE DOCUMENTATION    1/2/2023 - 02:11 Bradicardia noted on monitor.  Atropine (1 Amp) given.    1/2/2023 - 02:14 Pulse lost. CPR begun per ACLS protocol.    1/2/2023 - 02:15 EPI-1 (1 Amp) given.    1/2/2023 - 02:17 Pulse Check = No Pulse.    1/2/2023 - 02:18 EPI-2 (1 Amp) given.    1/2/2023 - 02:19 Pulse Check = No Pulse.    1/2/2023 - 02:21 Pulse Check = No Pulse.    1/2/2023 - 02:21 EPI-3 (1 Amp) given.    1/2/2023 - 02:24 Pulse Check = No Pulse.    1/2/2023 - 02:24 EPI-4 (1 Amp) given.    1/2/2023 - 02:27 Pulse Check = No Pulse.    1/2/2023 - 02:27 EPI-5 (1 Amp) given.    1/2/2023 - 02:30 Pulse Check = No Pulse.    1/2/2023 - 02:32 IV TNK (6 mL) Given.    1/2/2023 - 02:32 Fifteen minutes of uninterrupted chest compressions begun    1/2/2023 - 02:47 Pulse Check = No Pulse.    1/2/2023 - 02:47 TOD called by Dr. Randall.

## 2023-01-02 NOTE — CODE DOCUMENTATION
Code blue called after intubation on floor for respiratory distress.  Patient was in asystole at 0115 and chest compressions started.  Patient had BS of 81 and was given amp dextrose and due to renal dysfunction was given amp calcium gluconate and sanju of bicarb.  ACLS protocol continued and patient was then given amp of atropine for bradycardia but then went into asystole after about a minute of responding to the atropine.  Patient went into asystole and again was given epi while CPR continued.  Patient had ROSC after four epinephrnes at 0130.      Patient was transferred to the ICU and heparin about to be started for suspected PTE.  (He had new RBBB and had desaturation and agonal respirations after going to the bathroom.)   He was responding to commands and interacting with staff when he again went into asystole and CPR started at 0211 At this time epi given and CPR continued.  Patient was given five rounds of epi while TNK brought by pharmacy and given IVP with fifteen minutes of uninterrupted  chest compressions.  Patient remained in asystole and TOD 0247.

## 2023-01-02 NOTE — PROGRESS NOTES
[facility]  [facility] Medicine  Progress Note    Patient Name: Gama Cui  MRN: [id number]  Patient Class: IP- Inpatient   Admission Date: 12/31/2022  Length of Stay: 1 days  Attending Physician: Bruce Barger MD  Primary Care Provider: YONI Mcdaniels        Subjective:     Principal Problem:Acute blood loss anemia        HPI:  77-year-old man seen emergency room department on 12/31/2023.  He complains of 3-4 weeks history of profound weakness and fatigue.  Complains of dyspnea on exertion.  He denies melena hematochezia hemoptysis and and hematuria.  He stated he has had no active bleeding.  He denies chest pain.  There is no nausea vomiting diarrhea constipation.  Patient presents with symptomatic anemia.      Overview/Hospital Course:  No notes on file    Interval History:  Patient feeling better today.  Patient is in no acute distress.  He is status post 2 units of packed red blood cells due to anemia.  Will repeat his hemoglobin hematocrit today.  Consult GI for EGD.    Review of Systems   Constitutional:  Negative for fatigue and fever.   Cardiovascular:  Negative for chest pain.   Psychiatric/Behavioral:  Negative for confusion.    Objective:     Vital Signs (Most Recent):  Temp: 98.2 °F (36.8 °C) (01/01/23 1630)  Pulse: 74 (01/01/23 1630)  Resp: 16 (01/01/23 1630)  BP: 137/64 (01/01/23 1630)  SpO2: (!) 93 % (01/01/23 1630)   Vital Signs (24h Range):  Temp:  [98.2 °F (36.8 °C)-99.2 °F (37.3 °C)] 98.2 °F (36.8 °C)  Pulse:  [] 74  Resp:  [12-27] 16  SpO2:  [87 %-100 %] 93 %  BP: (125-223)/(45-95) 137/64     Weight: 61 kg (134 lb 6.4 oz)  Body mass index is 18.23 kg/m².    Intake/Output Summary (Last 24 hours) at 1/1/2023 1814  Last data filed at 12/31/2022 1842  Gross per 24 hour   Intake 337 ml   Output --   Net 337 ml      Physical Exam  Vitals and nursing note reviewed.   Constitutional:       Appearance: Normal appearance.   Cardiovascular:      Rate and Rhythm:  Normal rate and regular rhythm.      Pulses: Normal pulses.      Heart sounds: Normal heart sounds.   Pulmonary:      Effort: Pulmonary effort is normal.      Breath sounds: Normal breath sounds.   Abdominal:      General: Abdomen is flat. Bowel sounds are normal.      Palpations: Abdomen is soft.   Musculoskeletal:         General: Normal range of motion.   Skin:     General: Skin is warm and dry.   Neurological:      General: No focal deficit present.      Mental Status: He is alert and oriented to person, place, and time. Mental status is at baseline.       Significant Labs: All pertinent labs within the past 24 hours have been reviewed.  BMP:   Recent Labs   Lab 12/31/22  1235   *      K 4.6   *   CO2 17*   BUN 86*   CREATININE 5.20*   CALCIUM 8.9     CBC:   Recent Labs   Lab 12/31/22  1231 12/31/22  1235 01/01/23  1514   WBC  --  5.67  --    HGB  --  7.8* 12.1*   HCT 23* 25.4* 37.7*   PLT  --  354  --        Significant Imaging: I have reviewed all pertinent imaging results/findings within the past 24 hours.      Assessment/Plan:      * Acute blood loss anemia  Patient presented with profound anemia.    Will start a complete anemia workup.  Will check iron ferritin TIBC  B12 folate   Haptoglobin, LDH.  Transfuse 2 units of packed red blood cells   Follow his H&H closely.      Stage 3 chronic kidney disease    Follow creatinine    Hyperlipidemia  Resume statin      Essential hypertension    P.r.n. hydralazine, resume home medications      VTE Risk Mitigation (From admission, onward)         Ordered     IP VTE HIGH RISK PATIENT  Once         12/31/22 1742     Place sequential compression device  Until discontinued         12/31/22 1742     Place JUAN ANTONIO hose  Until discontinued         12/31/22 1742                Discharge Planning   JOSE:      Code Status: Full Code   Is the patient medically ready for discharge?:     Reason for patient still in hospital (select all that apply): Treatment                      Bruce Barger MD  Department of Hospital Medicine   [facility]

## 2023-01-02 NOTE — NURSING
Rapid response initiated by OC Nomi Laws related to patients decline in mental status and decreased oxygen, level 50%-60%, patient previously oriented x4 but now only responds with nodding yes regardless of questions asked. Continuous palpation of femoral pulse until pulse no longer palpated and ACLS pathway activated. ROSC obtained and patient transferred to ICU.

## 2023-01-02 NOTE — PLAN OF CARE
Problem: Adult Inpatient Plan of Care  Goal: Plan of Care Review  Outcome: Ongoing, Progressing  Goal: Patient-Specific Goal (Individualized)  Outcome: Ongoing, Progressing  Goal: Absence of Hospital-Acquired Illness or Injury  Outcome: Ongoing, Progressing  Goal: Optimal Comfort and Wellbeing  Outcome: Ongoing, Progressing  Goal: Readiness for Transition of Care  Outcome: Ongoing, Progressing     Problem: Fall Injury Risk  Goal: Absence of Fall and Fall-Related Injury  Outcome: Ongoing, Progressing     Problem: Restraint, Nonbehavioral (Nonviolent)  Goal: Absence of Harm or Injury  Outcome: Ongoing, Progressing

## 2023-01-02 NOTE — ASSESSMENT & PLAN NOTE
Follow creatinine   Maged Hancock (MD), Pediatrics  [address] [place] [address]  Phone: [phone]  Fax: [phone]

## 2023-01-03 PROBLEM — N17.9 ACUTE KIDNEY INJURY: Status: ACTIVE | Noted: 2023-01-03

## 2023-01-03 PROBLEM — I46.9 CARDIOPULMONARY ARREST: Status: ACTIVE | Noted: 2023-01-03

## 2023-01-03 NOTE — PLAN OF CARE
[facility] ICU  Discharge Final Note    Primary Care Provider: YONI Mcdaniels    Expected Discharge Date:     Final Discharge Note (most recent)       Final Note - 23 1351          Final Note    Assessment Type Final Discharge Note     Anticipated Discharge Disposition         Post-Acute Status    Discharge Delays None known at this time                     Important Message from Medicare

## 2023-01-04 NOTE — PHYSICIAN QUERY
PT Name: Gama Cui  MR #: [id number]     DOCUMENTATION CLARIFICATION     CDS/: Pinky Lei RN              Contact information: [email or web address]    1/12 MD made an addendum to DC summary addressing diagnosis JT  This form is a permanent document in the medical record.     Query Date: January 4, 2023    By submitting this query, we are merely seeking further clarification of documentation.  Please utilize your independent clinical judgment when addressing the question(s) below.  The Medical Record contains the following   Indicators   Supporting Clinical Findings Location in Medical Record   x SOB, HERNANDEZ, Wheezing, Productive Cough, Use of Accessory Muscles, etc. (He had new RBBB and had desaturation and agonal respirations after going to the bathroom.)    1/2 Hosp Med PN   x RR         ABGs         O2 sat Rapid response ... decline in mental status and decreased oxygen, level 50%-60%, patient previously oriented x4 but now only responds with nodding yes regardless of questions asked    O2 sat 70%  O2 sat 60%   1/2 RN PN      1/2 Flowsheet            Hypoxia/Hypercapnia     x BiPAP/Intubation/Mechanical Ventilation Intubation  Indication:  Respiratory failure 1/2 Hosp Med MD PN       x Supplemental O2 Mechanical vent- A/c 400/ 14/ +5 1/2 Flowsheet      Home O2, Oxygen Dependence     x Respiratory distress or failure Respiratory failure 1/2 Hosp Med MD PN      Radiology findings     x Acute/Chronic Illness Patient was transferred to the ICU and heparin about to be started for suspected PTE. 1/2 Hosp Med PN       x Treatment Oxygen  Pulse oximetry  Mech Vent 1/2 NSG orders    Other         The noted clinical guidelines are only system guidelines and do not replace the providers clinical judgment.    Provider, please specify the diagnosis or diagnoses associated with above clinical findings.     [    ] Acute Respiratory Failure with Hypoxia -   ABG pO2 < 60 mmHg or O2 sat of <91% on room  air and respiratory symptoms documented     [    ] Other Respiratory Diagnosis (please specify): _________________     [   ] Clinically Undetermined         Please document in your progress notes daily for the duration of treatment until resolved and include in your discharge summary.     Reference:    BLANCA Yun MD. (2020, March 13). Acute respiratory distress syndrome: Clinical features, diagnosis, and complications in adults ([id number] HANDY Steel MD & [id number] JANETT Carreon MD, Eds.). Retrieved November 13, 2020, from https://www.[employer]/contents/acute-respiratory-distress-syndrome-clinical-features-diagnosis-and-complications-in-adults?search=ards&source=search_result&selectedTitle=1~150&usage_type=default&display_rank=1  Form No. [id number]

## 2023-01-04 NOTE — PHYSICIAN QUERY
PT Name: Gama Cui  MR #: [id number]     DOCUMENTATION CLARIFICATION     CDS/: Pinky Lei RN             Contact information: [email or web address]    1/12 MD made an addendum to DC summary addressing diagnosis JT  This form is a permanent document in the medical record.    Query Date: January 4, 2023    By submitting this query, we are merely seeking further clarification of documentation.  Please utilize your independent clinical judgment when addressing the question(s) below.    The Medical Record contains the following:   Indicator Supporting Clinical Findings Location in Medical Record    Kidney (Renal) Insufficiency     x Kidney (Renal) Failure/Injury Acute kidney injury    CKD 3 12/31 ED MD PN    1/1 HP      Nephrotoxic Agents     x BUN/Creatinine           GFR Bun/cr- 23/ 2.18  (previous encounter 10/11/22)     12/31   BUN 86    Creatinine 5.20    eGFR 11         10/11/22 Lab    12/31 Lab    Urine: Casts         Eosinophils      Dehydration      Nausea/Vomiting      Dialysis/CRRT     x Treatment BMP  Input and output  Urinalysis    NS 1000mL bolus x 2 doses  12/31 NSG orders      12/31 MAR    Other [facility] approved diagnostic criteria for acute kidney injury is based on KDIGO criteria:    An increase in serum creatinine > 0.3mg/dl within 48 hours  OR  Increase in serum creatinine to > 1.5x baseline, which is known or presumed to have occurred within the prior 7 days  OR  Urine volume <0.5 ml/kg/hr for 6 hours       The clinical guidelines noted above are only a system guideline. It does not replace the providers clinical judgment.     Provider, please specify the diagnosis or diagnoses associated with above clinical findings.     [    ] MARIEL-ATN and CKD 3     [    ] MARIEL and CKD 3       [    ] CKD 3 only     [    ] Other (please specify): _______________________________   [  ] Clinically Undetermined         Please document in your progress notes daily for the  duration of treatment until resolved and include in your discharge summary.    References:   KDIGO Clinical Practice Guideline for Acute Kidney Injury. (2012, March). Retrieved October 21, 2020, from https://kdigo.org/wp-content/uploads/2016/10/XBQWO-4357-KHK-Guideline-English.pdf    HERMINIO Matthews MD, JANETT Rosenberg MD, & FORTINO Malone MD. (1960). Renal medullary necrosis [Abstract]. The American Journal of Medicine, 29(1), 132-156. Doi:https://www.sciencedirect.com/science/article/abs/pii/7994455026553907    FORTINO Cristobal MD, & BRENDA Solitario MD, MS. (2020, June 18). Definition and staging of chronic kidney disease in adults ([id number] JANETT Ríos MD, ScD & [id number] BEV Soto MD, MSc, Eds.). Retrieved October 21, 2020, from https://www.[employer]/contents/definition-and-staging-of-chronic-kidney-disease-in-adults?search=ckd%20staging&source=search_result&selectedTitle=1~150&usage_type=default&display_rank=1     LORENA Underwood MD, FACP. (2015, Amy 15). Acute kidney injury revisited. Retrieved October 21, 2020, from https://acphospitalist.org/archives/2015/06/coding-acute-kidney-injury.htm    NINA Mckeon MD. (2019, July). Renal Cortical Necrosis. Retrieved October 21, 2020, from https://www.[employer])/professional/genitourinary-disorders/renovascular-disorders/renal-cortical-necrosis    Form No. [id number]

## 2023-01-04 NOTE — PHYSICIAN QUERY
PT Name: Gama Cui  MR #: [id number]     DOCUMENTATION CLARIFICATION      CDS/: Pinky Lei RN             Contact information: [email or web address]    This form is a permanent document in the medical record.     Query Date: January 4, 2023    Dear Provider,  By submitting this query, we are merely seeking further clarification of documentation.  Please utilize your independent clinical judgment when addressing the question(s) below.     The Medical Record contains the following:    Supporting Clinical Findings Location in Medical Record   Patient was transferred to the ICU and heparin about to be started for suspected PTE.      (He had new RBBB and had desaturation and agonal respirations after going to the bathroom.)   He was responding to commands and interacting with staff when he again went into asystole and CPR started at 0211 At this time epi given and CPR continued.      Patient was given five rounds of epi while TNK brought by pharmacy and given IVP with fifteen minutes of uninterrupted  chest compressions.       1/2 Hosp Med PN   Heparin gtt    Multiple rounds of epi as well as TNK (tenecteplase) given with fifteen minutes of uninterrupted CPR   1/2 MAR    1/2 DC summary     Please clarify if the PTE (pulmonary Thromboembolism) diagnosis has been:    [  ] PTE - Ruled In     [  ] Treated possible PTE     [  ] PTE -Ruled Out     [  ] Other/Clarification of findings (please specify): _______________      [   ] Clinically undetermined   Treated Possible PTE.      Please document in your progress notes daily for the duration of treatment, until resolved, and include in your discharge summary.    Form No. [id number]

## 2023-01-04 NOTE — PHYSICIAN QUERY
PT Name: Gama Cui  MR #: [id number]    DOCUMENTATION CLARIFICATION     CDS/: Pinky Lei RN             Contact information: [email or web address]    1/12 MD made an addendum to DC summary addressing diagnosis JT  This form is a permanent document in the medical record.     Query Date: January 4, 2023    By submitting this query, we are merely seeking further clarification of documentation. Please utilize your independent clinical judgment when addressing the question(s) below.    The Medical Record contains the following:   Indicators   Supporting Clinical Findings Location in Medical Record   x AMS, Confusion,  LOC, etc.  Negative for confusion     He is alert and oriented to person, place, and time.   Mental status is at baseline.     Rapid response initiated ...related to patients decline in mental status and decreased oxygen, level 50%-60%, patient previously oriented x4 but now only responds with nodding yes regardless of questions asked    went unresponsive without a pulse    He was responding to commands and interacting with staff when he again went into asystole and CPR started at 0211    1/1 HP    1/1 Hosp Med PN      1/2 RN PN        1/2 RN PN    1/2 Hosp Med PN   x Acute/Chronic Illness Acute kidney injury  Acute blood loss anemia  CKD 3    Respiratory failure    Patient was transferred to the ICU and heparin about to be started for suspected PTE   12/31 ED MD PN  1/2 HP    1/2 Hosp Med PN      1/2 Hosp [facility] MD PN   x Radiology Findings  Diffuse patchy airspace opacities within the lungs, probably pulmonary edema 1/2 CXR   x Electrolyte Imbalance Bun/cr= 86/ 5.20      x Medication NS 1000mL bolus x 2 doses  12/31  D10% 125mL bolus 1/2   Heparin gtt 1/2    MAR   x Treatment         Cardiac monitor  PUlse oximetry  Mech Ventilation  Oxygen  BMP, CBC  CXR 1/2 NSG orders    Other       The noted clinical guidelines are only system guidelines and do not replace the providers  clinical judgment.    The [place] of Neu[employer] Disorders and Stroke (NINDS) of the NIH describes encephalopathy as any diffuse disease of the brain that alters brain function or structure.    Provider, please specify the diagnosis or diagnoses associated with above clinical findings.    [   ] Metabolic Encephalopathy -   Due to electrolyte imbalance, metabolic derangements, or infectious processes, includes Septic Encephalopathy, Uremic Encephalopathy     [   ] Encephalopathy, unspecified        [   ] Other Encephalopathy (please specify): ____________________     [   ] Other  (please specify condition): __________   [   ]  Clinically Undetermined       Please document in your progress notes daily for the duration of treatment until resolved, and include in your discharge summary.    References:  JF Haro RN, CCDS. (2018, June 9). Notes from the Instructor: Encephalopathy tips. Retrieved October 22, 2020, from [email or web address]    ICD-9-CM Coding Clinic First Quarter 2013, Effective with discharges: October 21, 2013 [facility] § Seizure with encephalopathy due to postictal state (2013).    ICD-10-CM/PCS [employer] Integrated Codebook (Version V.20.8.10.0) [Computer software]. (2020). Retrieved October 21, 2020.    National [place] of Neurological Disorders and Stroke. (2019, March 27). Retrieved October 22, 2020, from https://www.ninds.nih.gov/Disorders/[email or web address]-Disorders/Vicdtsjjwzjneu-Yatikpzyupf-Zite    Form No. 68600

## 2023-01-06 LAB
BACTERIA BLD CULT: NORMAL
BACTERIA BLD CULT: NORMAL

## 2023-01-09 LAB
HCO3 UR-SCNC: 5.5 MMOL/L
PCO2 BLDA: 30 MMHG (ref 35–48)
PH SMN: 6.87 [PH]
PO2 BLDA: 130 MMHG (ref 83–108)
POC A-ADO2: 546 MMHG
POC BASE EXCESS: -27.3 MMOL/L (ref -2–3)
POC SATURATED O2: 97.7 %